# Patient Record
Sex: MALE | Race: WHITE | Employment: OTHER | URBAN - METROPOLITAN AREA
[De-identification: names, ages, dates, MRNs, and addresses within clinical notes are randomized per-mention and may not be internally consistent; named-entity substitution may affect disease eponyms.]

---

## 2017-01-26 ENCOUNTER — DOCUMENTATION ONLY (OUTPATIENT)
Dept: CARDIOLOGY CLINIC | Age: 67
End: 2017-01-26

## 2017-01-26 ENCOUNTER — OFFICE VISIT (OUTPATIENT)
Dept: CARDIOLOGY CLINIC | Age: 67
End: 2017-01-26

## 2017-01-26 VITALS
WEIGHT: 255.2 LBS | HEART RATE: 76 BPM | DIASTOLIC BLOOD PRESSURE: 62 MMHG | BODY MASS INDEX: 33.82 KG/M2 | SYSTOLIC BLOOD PRESSURE: 130 MMHG | HEIGHT: 73 IN | RESPIRATION RATE: 16 BRPM

## 2017-01-26 DIAGNOSIS — I25.83 CORONARY ARTERY DISEASE DUE TO LIPID RICH PLAQUE: Primary | ICD-10-CM

## 2017-01-26 DIAGNOSIS — I25.10 CORONARY ARTERY DISEASE DUE TO LIPID RICH PLAQUE: Primary | ICD-10-CM

## 2017-01-26 DIAGNOSIS — E78.5 DYSLIPIDEMIA: ICD-10-CM

## 2017-01-26 DIAGNOSIS — I10 HTN (HYPERTENSION), MALIGNANT: ICD-10-CM

## 2017-01-26 DIAGNOSIS — I11.9 HYPERTENSIVE HEART DISEASE WITHOUT HEART FAILURE: ICD-10-CM

## 2017-01-26 RX ORDER — METFORMIN HYDROCHLORIDE 500 MG/1
1 TABLET ORAL 2 TIMES DAILY
COMMUNITY
Start: 2017-01-25

## 2017-01-26 NOTE — PROGRESS NOTES
GI appointment has been made for patient to see Dr. Isha Asher 1/31/17 @ 12:30- Date/time/location provided to patient. Dr. Ce Jordan appointment made for left knee evaluation scheduled for today 1/26/17 @ 2:45. Date/time/location provided to patient.   2 pt identifiers used

## 2017-01-26 NOTE — MR AVS SNAPSHOT
Visit Information Date & Time Provider Department Dept. Phone Encounter #  
 1/26/2017 11:20 AM Boris Ochoa MD [facility] Kevin Jerez [phone] [id number] Your Appointments 7/28/2017 11:20 AM  
ESTABLISHED PATIENT with Boris Ochoa MD  
[facility] ([facility]) Appt Note: 6 month follow up  
 [address]  
[address] [facility] [address] Upcoming Health Maintenance Date Due Hepatitis C Screening 1950 DTaP/Tdap/Td series (1 - Tdap) 7/4/1972 FOBT Q 1 YEAR AGE 50-75 7/4/2001 ZOSTER VACCINE AGE 60> 7/4/2011 GLAUCOMA SCREENING Q2Y 7/4/2016 Pneumococcal 65+ Low/Medium Risk (1 of 2 - PCV13) 7/4/2016 MEDICARE YEARLY EXAM 7/4/2016 INFLUENZA AGE 9 TO ADULT 8/1/2016 Allergies as of 1/26/2017  Review Complete On: 1/26/2017 By: Raoul Simmonds Severity Noted Reaction Type Reactions Mavik [Trandolapril]  06/26/2012   Side Effect Nausea and Vomiting  
 Sulfa Dyne  06/26/2012   Side Effect Hives Current Immunizations  Never Reviewed No immunizations on file. Not reviewed this visit You Were Diagnosed With   
  
 Codes Comments Coronary artery disease due to lipid rich plaque    -  Primary ICD-10-CM: I25.10, I25.83 ICD-9-CM: 414.00, 414.3 Hypertensive heart disease without heart failure     ICD-10-CM: I11.9 ICD-9-CM: 402.90 Dyslipidemia     ICD-10-CM: E78.5 ICD-9-CM: 272.4 HTN (hypertension), malignant     ICD-10-CM: I10 
ICD-9-CM: 401.0 Vitals BP Pulse Resp Height(growth percentile) Weight(growth percentile) BMI  
 130/62 (BP 1 Location: Right arm, BP Patient Position: Sitting) 76 16 6' 1\" (1.854 m) 255 lb 3.2 oz (115.8 kg) 33.67 kg/m2 Smoking Status Former Smoker Vitals History BMI and BSA Data Body Mass Index Body Surface Area  
 33.67 kg/m 2 2.44 m 2 Preferred Pharmacy Pharmacy Name Phone [facility] [phone] Your Updated Medication List  
  
   
This list is accurate as of: 1/26/17 11:57 AM.  Always use your most recent med list.  
  
  
  
  
 albuterol 2.5 mg /3 mL (0.083 %) nebulizer solution Commonly known as:  PROVENTIL VENTOLIN  
1.25 mg by Nebulization route every four (4) hours as needed. ALLEGRA 180 mg tablet Generic drug:  fexofenadine Take  by mouth daily. aspirin delayed-release 81 mg tablet Take 81 mg by mouth daily. clopidogrel 75 mg Tab Commonly known as:  PLAVIX TAKE ONE TABLET BY MOUTH DAILY FLEXERIL 10 mg tablet Generic drug:  cyclobenzaprine Take  by mouth three (3) times daily as needed. FLOMAX 0.4 mg capsule Generic drug:  tamsulosin Take 0.4 mg by mouth daily. HYDROcodone-acetaminophen 5-325 mg per tablet Commonly known as:  March Castles Take 1 Tab by mouth daily as needed for Pain. LEVOXYL 150 mcg tablet Generic drug:  levothyroxine Take  by mouth Daily (before breakfast). LIPITOR 20 mg tablet Generic drug:  atorvastatin Take 20 mg by mouth daily. metFORMIN 500 mg tablet Commonly known as:  GLUCOPHAGE Take 1 Tab by mouth two (2) times a day. metoprolol tartrate 25 mg tablet Commonly known as:  LOPRESSOR  
TAKE 1/2 TABLET BY MOUTH TWICE DAILY  
  
 multivitamin tablet Commonly known as:  ONE A DAY Take 1 Tab by mouth daily. nitroglycerin 0.4 mg SL tablet Commonly known as:  NITROSTAT  
1 tablet by SubLINGual route every five (5) minutes as needed for Chest Pain for up to 3 doses. PREVACID 30 mg capsule Generic drug:  lansoprazole Take 30 mg by mouth two (2) times a day. valsartan-hydroCHLOROthiazide 160-12.5 mg per tablet Commonly known as:  DIOVAN-HCT  
TAKE ONE TABLET BY MOUTH DAILY We Performed the Following AMB POC EKG ROUTINE W/ 12 LEADS, INTER & REP [41659 CPT(R)] Introducing hospitals & [facility] SERVICES! Caitlin Velasco introduces [employer] patient portal. Now you can access parts of your medical record, email your doctor's office, and request medication refills online. 1. In your internet browser, go to [email or web address] 2. Click on the First Time User? Click Here link in the Sign In box. You will see the New Member Sign Up page. 3. Enter your [employer] Access Code exactly as it appears below. You will not need to use this code after youve completed the sign-up process. If you do not sign up before the expiration date, you must request a new code. · [employer] Access Code: [email or web address] Expires: 4/26/2017 11:56 AM 
 
4. Enter the last four digits of your Social Security Number (xxxx) and Date of Birth (mm/dd/yyyy) as indicated and click Submit. You will be taken to the next sign-up page. 5. Create a Intigua ID. This will be your Intigua login ID and cannot be changed, so think of one that is secure and easy to remember. 6. Create a [employer] password. You can change your password at any time. 7. Enter your Password Reset Question and Answer. This can be used at a later time if you forget your password. 8. Enter your e-mail address. You will receive e-mail notification when new information is available in [address] 9. Click Sign Up. You can now view and download portions of your medical record. 10. Click the Download Summary menu link to download a portable copy of your medical information. If you have questions, please visit the Frequently Asked Questions section of the [employer] website. Remember, [employer]ua is NOT to be used for urgent needs. For medical emergencies, dial 911. Now available from your iPhone and Android! Please provide this summary of care documentation to your next provider. Your primary care clinician is listed as Miguel Hand III. If you have any questions after today's visit, please call [phone].

## 2017-01-26 NOTE — PROGRESS NOTES
[facility]: Andre Azargriselda  [phone]    HPI: Betty Walter is a 73y.o. year-old male with hx of hypertensive heart disease and left heart squeezing. Last visit having some chest pain, stress testing and cho were ok. He is feeling much better now, has a little chest pain with laying down at night that feels more like indigestion and my be stomach related. Also down about 10 pounds from prior. Has a history of ulcers, but no follow-up recently. Many ulcers. Recommended GI follow-up. Needs ortho evaluation for weak leg and left knee pian and giving way. Has fallen. NO shortness of breath and no dizzy spells or cold sweats like he was having before the last stent. He has cut down on his coffee from a few pots a day to just a couple cups. He is a little worried abou this wife's memory. Assessment/Plan:  1. HTN Heart Disease- at goal today, diovanHCT  2. Hyperlipidemia-continue lipitor 20mg daily  3. CAD- s/p LAD DOMINIC 6/14 to LAD, recent negative stress  -cont aspirin, plavix  4. Obesity- Body mass index is 33.67 kg/(m^2). working on diet, exercise as able down about 10 more pounds, to 255 today  5. CVA- no new symptoms, hx with right sided weakness  6. PE/DVT-resolved, off coumadin, post-op from back surgery  7. History of neck pain, in the spinal cord during back surgery with CSF leak  8. Chest pain- refer back to GI given PUD  9. PUD remote hx last egd ok in 2014  10. DM, just diagnosed, work on diet and weight loss, A1C 8    Pao 11/16 normal, inferior fixed defect  Echo 65% mild AI, trivial TR PAP 20  CT chest 6/16 coronary ca, no PE, thoracic degeneration, normal Ao  Lexiscan nuc 6/15 EF 78% no ischemia  Cath 6/2014 LAD 70% s/p 3.5x15mm Xpedition everolimus stent, Lcx mild irreg, RCA nl  Echo 6/14:  Within normal limit, EF 60%, triv AI   Lexiscan Nuc: EF 74%, inferior perfusion defect  Echo 4/10 EF 60% mod cLVH PAP 40, mild SARAN  Stress test 4/10 no ischemia  Cath 2004 with Dr. Muriel Gardner right leg with angioseal.     Fam hx: early CAD, son at 39   Soc hx: retired, remote tobacco, occ etoh    He  has a past medical history of CAD (coronary artery disease); Essential hypertension; Hyperlipidemia; and Stroke ([facility] [address] Cardiovascular ROS: positive for chest pain or dyspnea on exertion  Respiratory ROS: negative for - cough, hemoptysis or orthopnea  Neurological ROS: negative for - bowel and bladder control changes or dizziness  All other systems negative except as above. PE  Vitals:    01/26/17 1121 01/26/17 1126   BP: 130/62 130/62   Pulse: 76    Resp: 16    Weight: 255 lb 3.2 oz (115.8 kg)    Height: 6' 1\" (1.854 m)     Body mass index is 33.67 kg/(m^2).    General appearance - alert, well appearing, and in no distress  Mental status - affect appropriate to mood  Eyes - sclera anicteric, moist mucous membranes  Neck - supple, no significant adenopathy  Lymphatics - no  lymphadenopathy  Chest - clear to auscultation, no wheezes, rales or rhonchi  Heart - normal rate, regular rhythm, normal S1, S2, no murmurs, rubs, clicks or gallops  Abdomen - soft, nontender, nondistended, no masses or organomegaly  Back exam - full range of motion, no tenderness  Neurological - cranial nerves II through XII grossly intact, no focal deficit  Musculoskeletal - no muscular tenderness noted, normal strength  Extremities - peripheral pulses 1+, 1-2+ pedal edema  Skin - normal coloration  no rashes    12 lead ECG: NSR    Recent Labs:  No results found for: CHOL  No results found for: KRISSY  Lab Results   Component Value Date/Time    BUN 16 06/05/2014 12:33 PM     Lab Results   Component Value Date/Time    Potassium 4.0 06/05/2014 12:33 PM     No results found for: HBA1C, [id number]  No components found for: HGBI,  IHGB,  HGB,  HGBP,  WBHGB  Lab Results   Component Value Date/Time    PLATELET 621 06/44/5987 12:33 PM       Reviewed:  Past Medical History   Diagnosis Date    CAD (coronary artery disease)     Essential hypertension     Hyperlipidemia     Stroke ([facility] [address]      History   Smoking Status    Former Smoker    Packs/day: 1.50    Years: 5.00    Types: Cigarettes   Smokeless Tobacco    Not on file     History   Alcohol Use    0.0 oz/week    0 Standard drinks or equivalent per week     Comment: rare     Allergies   Allergen Reactions    Mavik [Trandolapril] Nausea and Vomiting    Sulfa Dyne Hives       Current Outpatient Prescriptions   Medication Sig    valsartan-hydroCHLOROthiazide (DIOVAN-HCT) 160-12.5 mg per tablet TAKE ONE TABLET BY MOUTH DAILY    metoprolol tartrate (LOPRESSOR) 25 mg tablet TAKE 1/2 TABLET BY MOUTH TWICE DAILY    metFORMIN (GLUCOPHAGE) 500 mg tablet Take 1 Tab by mouth two (2) times a day.  clopidogrel (PLAVIX) 75 mg tablet TAKE ONE TABLET BY MOUTH DAILY    nitroglycerin (NITROSTAT) 0.4 mg SL tablet 1 tablet by SubLINGual route every five (5) minutes as needed for Chest Pain for up to 3 doses.  HYDROcodone-acetaminophen (NORCO) 5-325 mg per tablet Take 1 Tab by mouth daily as needed for Pain.  aspirin delayed-release 81 mg tablet Take 81 mg by mouth daily.  multivitamin (ONE A DAY) tablet Take 1 Tab by mouth daily.  atorvastatin (LIPITOR) 20 mg tablet Take 20 mg by mouth daily.  fexofenadine (ALLEGRA) 180 mg tablet Take  by mouth daily.  levothyroxine (LEVOXYL) 150 mcg tablet Take  by mouth Daily (before breakfast).  lansoprazole (PREVACID) 30 mg capsule Take 30 mg by mouth two (2) times a day.  tamsulosin (FLOMAX) 0.4 mg capsule Take 0.4 mg by mouth daily.  albuterol (PROVENTIL VENTOLIN) 2.5 mg /3 mL (0.083 %) nebulizer solution 1.25 mg by Nebulization route every four (4) hours as needed.  cyclobenzaprine (FLEXERIL) 10 mg tablet Take  by mouth three (3) times daily as needed. No current facility-administered medications for this visit.       [facility] [place]  [address]

## 2017-07-28 ENCOUNTER — OFFICE VISIT (OUTPATIENT)
Dept: CARDIOLOGY CLINIC | Age: 67
End: 2017-07-28

## 2017-07-28 VITALS
HEART RATE: 64 BPM | SYSTOLIC BLOOD PRESSURE: 124 MMHG | WEIGHT: 256.6 LBS | DIASTOLIC BLOOD PRESSURE: 76 MMHG | BODY MASS INDEX: 34.01 KG/M2 | RESPIRATION RATE: 16 BRPM | HEIGHT: 73 IN

## 2017-07-28 DIAGNOSIS — E78.5 DYSLIPIDEMIA: ICD-10-CM

## 2017-07-28 DIAGNOSIS — I25.83 CORONARY ARTERY DISEASE DUE TO LIPID RICH PLAQUE: Primary | ICD-10-CM

## 2017-07-28 DIAGNOSIS — R42 DIZZINESS: Primary | ICD-10-CM

## 2017-07-28 DIAGNOSIS — I10 HTN (HYPERTENSION), MALIGNANT: ICD-10-CM

## 2017-07-28 DIAGNOSIS — I11.9 HYPERTENSIVE HEART DISEASE WITHOUT HEART FAILURE: ICD-10-CM

## 2017-07-28 DIAGNOSIS — I25.10 CORONARY ARTERY DISEASE DUE TO LIPID RICH PLAQUE: Primary | ICD-10-CM

## 2017-07-28 NOTE — PROGRESS NOTES
[facility]: Vernon Garcia  [phone]    HPI: Panfilo Lund is a 78y.o. year-old male with hx of hypertensive heart disease and left heart squeezing. He had bronchitis and had to go to the ER. He got treated with zpack and prednisone. He felt very dizzy the last few days after drinking a probiotic and wonders if it could be related. Discussed stopping it. No more chest pain. Got fluid drawn off and injection for the knee. Nees surgery but cant afford to do it due to the demands of taking care of things for him and his wife. He is down a pound but has been splurging. Has ivania drinking too much snapple green tea. NO shortness of breath and no dizzy spells or cold sweats like he was having before the last stent. He has cut down on his coffee from a few pots a day to just a couple cups. He is a little worried about his wife's memory. He is worried aobut the heart causing dizziness, he has some qrs widening and pr prolongation and viktor, will check holter. Assessment/Plan:  1. HTN Heart Disease- at goal today, diovanHCT  2. Hyperlipidemia-continue lipitor 20mg daily  3. CAD- s/p LAD DOMINIC 6/14 to LAD, recent negative stress  -cont aspirin, plavix  4. Obesity- Body mass index is 33.85 kg/(m^2). working on diet, exercise as able down about 10 more pounds, to 255 today  5. CVA- no new symptoms, hx with right sided weakness  6. PE/DVT-resolved, off coumadin, post-op from back surgery  7. History of neck pain, in the spinal cord during back surgery with CSF leak  8. Chest pain- refer back to GI given PUD  9. PUD remote hx last egd ok in 2014  10. DM, just diagnosed, work on diet and weight loss, A1C 8  11.  EKG Sb with 1st degree av block, dizziness with bradycardia- loop to check on cause    Pao 11/16 normal, inferior fixed defect  Echo 65% mild AI, trivial TR PAP 20  CT chest 6/16 coronary ca, no PE, thoracic degeneration, normal Ao  Lexiscan nuc 6/15 EF 78% no ischemia  Cath 6/2014 LAD 70% s/p 3.5x15mm Xpedition everolimus stent, Lcx mild irreg, RCA nl  Echo 6/14: Within normal limit, EF 60%, triv AI   Lexiscan Nuc: EF 74%, inferior perfusion defect  Echo 4/10 EF 60% mod cLVH PAP 40, mild SARAN  Stress test 4/10 no ischemia  Cath 2004 with Dr. Hao Rocha right leg with angioseal.     Fam hx: early CAD, son at 39   Soc hx: retired, remote tobacco, occ etoh    He  has a past medical history of CAD (coronary artery disease); Essential hypertension; Hyperlipidemia; and Stroke ([facility] [address] Cardiovascular ROS: positive for chest pain or dyspnea on exertion  Respiratory ROS: negative for - cough, hemoptysis or orthopnea  Neurological ROS: negative for - bowel and bladder control changes or dizziness  All other systems negative except as above. PE  Vitals:    07/28/17 1148   BP: 124/76   Pulse: 64   Resp: 16   Weight: 256 lb 9.6 oz (116.4 kg)   Height: 6' 1\" (1.854 m)    Body mass index is 33.85 kg/(m^2).    General appearance - alert, well appearing, and in no distress  Mental status - affect appropriate to mood  Eyes - sclera anicteric, moist mucous membranes  Neck - supple, no significant adenopathy  Lymphatics - no  lymphadenopathy  Chest - clear to auscultation, no wheezes, rales or rhonchi  Heart - normal rate, regular rhythm, normal S1, S2, no murmurs, rubs, clicks or gallops  Abdomen - soft, nontender, nondistended, no masses or organomegaly  Back exam - full range of motion, no tenderness  Neurological - cranial nerves II through XII grossly intact, no focal deficit  Musculoskeletal - no muscular tenderness noted, normal strength  Extremities - peripheral pulses 1+, 1-2+ pedal edema  Skin - normal coloration  no rashes    12 lead ECG: NSR    Recent Labs:  No results found for: CHOL  No results found for: KRISSY  Lab Results   Component Value Date/Time    BUN 16 06/05/2014 12:33 PM     Lab Results   Component Value Date/Time    Potassium 4.0 06/05/2014 12:33 PM     No results found for: HBA1C, SMY2QXAR  No components found for: HGBI,  IHGB,  HGB,  HGBP,  WBHGB  Lab Results   Component Value Date/Time    PLATELET 909 01/63/4705 12:33 PM       Reviewed:  Past Medical History:   Diagnosis Date    CAD (coronary artery disease)     Essential hypertension     Hyperlipidemia     Stroke ([facility] [address]      History   Smoking Status    Former Smoker    Packs/day: 1.50    Years: 5.00    Types: Cigarettes   Smokeless Tobacco    Never Used     History   Alcohol Use    0.0 oz/week    0 Standard drinks or equivalent per week     Comment: rare     Allergies   Allergen Reactions    Mavik [Trandolapril] Nausea and Vomiting    Sulfa Chilo Brandt       Current Outpatient Prescriptions   Medication Sig    valsartan-hydroCHLOROthiazide (DIOVAN-HCT) 160-12.5 mg per tablet TAKE ONE TABLET BY MOUTH DAILY    metoprolol tartrate (LOPRESSOR) 25 mg tablet TAKE 1/2 TABLET BY MOUTH TWICE DAILY    metFORMIN (GLUCOPHAGE) 500 mg tablet Take 1 Tab by mouth two (2) times a day.  clopidogrel (PLAVIX) 75 mg tablet TAKE ONE TABLET BY MOUTH DAILY    nitroglycerin (NITROSTAT) 0.4 mg SL tablet 1 tablet by SubLINGual route every five (5) minutes as needed for Chest Pain for up to 3 doses.  HYDROcodone-acetaminophen (NORCO) 5-325 mg per tablet Take 1 Tab by mouth daily as needed for Pain.  aspirin delayed-release 81 mg tablet Take 81 mg by mouth daily.  multivitamin (ONE A DAY) tablet Take 1 Tab by mouth daily.  atorvastatin (LIPITOR) 20 mg tablet Take 20 mg by mouth daily.  fexofenadine (ALLEGRA) 180 mg tablet Take  by mouth daily.  levothyroxine (LEVOXYL) 150 mcg tablet Take  by mouth Daily (before breakfast).  lansoprazole (PREVACID) 30 mg capsule Take 30 mg by mouth two (2) times a day.  tamsulosin (FLOMAX) 0.4 mg capsule Take 0.4 mg by mouth daily.  albuterol (PROVENTIL VENTOLIN) 2.5 mg /3 mL (0.083 %) nebulizer solution 1.25 mg by Nebulization route every four (4) hours as needed.       cyclobenzaprine (FLEXERIL) 10 mg tablet Take  by mouth three (3) times daily as needed. No current facility-administered medications for this visit.       [facility] [place]  [address], [address] [address] [facility] [address]

## 2017-07-28 NOTE — MR AVS SNAPSHOT
Visit Information Date & Time Provider Department Dept. Phone Encounter #  
 7/28/2017 11:20 AM Mary Ortega MD [facility] [place] [phone] [id number] Upcoming Health Maintenance Date Due Hepatitis C Screening 1950 DTaP/Tdap/Td series (1 - Tdap) 7/4/1972 FOBT Q 1 YEAR AGE 50-75 7/4/2001 ZOSTER VACCINE AGE 60> 5/4/2011 GLAUCOMA SCREENING Q2Y 7/4/2016 Pneumococcal 65+ Low/Medium Risk (1 of 2 - PCV13) 7/4/2016 MEDICARE YEARLY EXAM 7/4/2016 INFLUENZA AGE 9 TO ADULT 8/1/2017 Allergies as of 7/28/2017  Review Complete On: 1/26/2017 By: Linda Brar Severity Noted Reaction Type Reactions Mavik [Trandolapril]  06/26/2012   Side Effect Nausea and Vomiting  
 Sulfa Dyne  06/26/2012   Side Effect Hives Current Immunizations  Never Reviewed No immunizations on file. Not reviewed this visit You Were Diagnosed With   
  
 Codes Comments Coronary artery disease due to lipid rich plaque    -  Primary ICD-10-CM: I25.10, I25.83 ICD-9-CM: 414.00, 414.3 Hypertensive heart disease without heart failure     ICD-10-CM: I11.9 ICD-9-CM: 402.90 Dyslipidemia     ICD-10-CM: E78.5 ICD-9-CM: 272.4 HTN (hypertension), malignant     ICD-10-CM: I10 
ICD-9-CM: 401.0 Vitals BP Pulse Resp Height(growth percentile) Weight(growth percentile) BMI  
 124/76 (BP 1 Location: Left arm, BP Patient Position: Sitting) 64 16 6' 1\" (1.854 m) 256 lb 9.6 oz (116.4 kg) 33.85 kg/m2 Smoking Status Former Smoker BMI and BSA Data Body Mass Index Body Surface Area  
 33.85 kg/m 2 2.45 m 2 [phone] Your Updated Medication List  
  
   
This list is accurate as of: 7/28/17 12:24 PM.  Always use your most recent med list.  
  
  
  
  
 albuterol 2.5 mg /3 mL (0.083 %) nebulizer solution Commonly known as:  PROVENTIL VENTOLIN  
1.25 mg by Nebulization route every four (4) hours as needed. ALLEGRA 180 mg tablet Generic drug:  fexofenadine Take  by mouth daily. aspirin delayed-release 81 mg tablet Take 81 mg by mouth daily. clopidogrel 75 mg Tab Commonly known as:  PLAVIX TAKE ONE TABLET BY MOUTH DAILY FLEXERIL 10 mg tablet Generic drug:  cyclobenzaprine Take  by mouth three (3) times daily as needed. FLOMAX 0.4 mg capsule Generic drug:  tamsulosin Take 0.4 mg by mouth daily. HYDROcodone-acetaminophen 5-325 mg per tablet Commonly known as:  Cesar Johnny Take 1 Tab by mouth daily as needed for Pain. LEVOXYL 150 mcg tablet Generic drug:  levothyroxine Take  by mouth Daily (before breakfast). LIPITOR 20 mg tablet Generic drug:  atorvastatin Take 20 mg by mouth daily. metFORMIN 500 mg tablet Commonly known as:  GLUCOPHAGE Take 1 Tab by mouth two (2) times a day. metoprolol tartrate 25 mg tablet Commonly known as:  LOPRESSOR  
TAKE 1/2 TABLET BY MOUTH TWICE DAILY  
  
 multivitamin tablet Commonly known as:  ONE A DAY Take 1 Tab by mouth daily. nitroglycerin 0.4 mg SL tablet Commonly known as:  NITROSTAT  
1 tablet by SubLINGual route every five (5) minutes as needed for Chest Pain for up to 3 doses. PREVACID 30 mg capsule Generic drug:  lansoprazole Take 30 mg by mouth two (2) times a day. valsartan-hydroCHLOROthiazide 160-12.5 mg per tablet Commonly known as:  DIOVAN-HCT  
TAKE ONE TABLET BY MOUTH DAILY We Performed the Following AMB POC EKG ROUTINE W/ 12 LEADS, INTER & REP [39891 CPT(R)] Introducing [facility] & [facility] SERVICES! Betzaida Jasmine introduces [employer] patient portal. Now you can access parts of your medical record, email your doctor's office, and request medication refills online. 1. In your internet browser, go to [email or web address] 2. Click on the First Time User? Click Here link in the Sign In box. You will see the New Member Sign Up page. 3. Enter your [employer] Access Code exactly as it appears below. You will not need to use this code after youve completed the sign-up process. If you do not sign up before the expiration date, you must request a new code. · [employer] Access Code: [email or web address] Expires: 10/26/2017  8:04 AM 
 
4. Enter the last four digits of your Social Security Number (xxxx) and Date of Birth (mm/dd/yyyy) as indicated and click Submit. You will be taken to the next sign-up page. 5. Create a [employer] ID. This will be your [employer] login ID and cannot be changed, so think of one that is secure and easy to remember. 6. Create a [employer] password. You can change your password at any time. 7. Enter your Password Reset Question and Answer. This can be used at a later time if you forget your password. 8. Enter your e-mail address. You will receive e-mail notification when new information is available in [address] 9. Click Sign Up. You can now view and download portions of your medical record. 10. Click the Download Summary menu link to download a portable copy of your medical information. If you have questions, please visit the Frequently Asked Questions section of the [employer] website. Remember, [employer] is NOT to be used for urgent needs. For medical emergencies, dial 911. Now available from your iPhone and Android! Please provide this summary of care documentation to your next provider. Your primary care clinician is listed as Particia Bolls III. If you have any questions after today's visit, please call [phone].

## 2017-08-18 ENCOUNTER — TELEPHONE (OUTPATIENT)
Dept: CARDIOLOGY CLINIC | Age: 67
End: 2017-08-18

## 2017-08-18 NOTE — TELEPHONE ENCOUNTER
----- Message from Francis Tirado NP sent at 8/18/2017 10:32 AM EDT -----  Holter monitor looks ok, no significant bradycardia or heart block to cause his dizziness. Just a few PACs. Above monitor results given to patient.   2 pt identifiers used

## 2017-08-18 NOTE — PROGRESS NOTES
Holter monitor looks ok, no significant bradycardia or heart block to cause his dizziness. Just a few PACs.

## 2017-12-04 ENCOUNTER — OFFICE VISIT (OUTPATIENT)
Dept: CARDIOLOGY CLINIC | Age: 67
End: 2017-12-04

## 2017-12-04 VITALS
DIASTOLIC BLOOD PRESSURE: 56 MMHG | SYSTOLIC BLOOD PRESSURE: 122 MMHG | HEIGHT: 73 IN | BODY MASS INDEX: 33.61 KG/M2 | WEIGHT: 253.6 LBS | HEART RATE: 70 BPM | RESPIRATION RATE: 16 BRPM

## 2017-12-04 DIAGNOSIS — I10 HTN (HYPERTENSION), MALIGNANT: ICD-10-CM

## 2017-12-04 DIAGNOSIS — I25.10 CORONARY ARTERY DISEASE DUE TO LIPID RICH PLAQUE: Primary | ICD-10-CM

## 2017-12-04 DIAGNOSIS — E78.5 DYSLIPIDEMIA: ICD-10-CM

## 2017-12-04 DIAGNOSIS — I25.83 CORONARY ARTERY DISEASE DUE TO LIPID RICH PLAQUE: Primary | ICD-10-CM

## 2017-12-04 NOTE — MR AVS SNAPSHOT
Visit Information Date & Time Provider Department Dept. Phone Encounter #  
 12/4/2017  4:20 PM aSmuel Carl MD [facility] Ghanshyam Edmond [phone] [id number] Your Appointments 12/4/2017  4:20 PM  
ESTABLISHED PATIENT with Samuel Carl MD  
[facility] ([address]) Appt Note: 4 month follow up; 4 month follow up; .; .  
 [address] [facility] [address]  
[address] [facility] [address] Upcoming Health Maintenance Date Due Hepatitis C Screening 1950 DTaP/Tdap/Td series (1 - Tdap) 7/4/1972 FOBT Q 1 YEAR AGE 50-75 7/4/2001 ZOSTER VACCINE AGE 60> 5/4/2011 GLAUCOMA SCREENING Q2Y 7/4/2016 Pneumococcal 65+ Low/Medium Risk (1 of 2 - PCV13) 7/4/2016 MEDICARE YEARLY EXAM 7/4/2016 Influenza Age 5 to Adult 8/1/2017 Allergies as of 12/4/2017  Review Complete On: 12/4/2017 By: Joyce Carroll Severity Noted Reaction Type Reactions Mavik [Trandolapril]  06/26/2012   Side Effect Nausea and Vomiting  
 Sulfa Dyne  06/26/2012   Side Effect Hives Current Immunizations  Never Reviewed No immunizations on file. Not reviewed this visit You Were Diagnosed With   
  
 Codes Comments Coronary artery disease due to lipid rich plaque    -  Primary ICD-10-CM: I25.10, I25.83 ICD-9-CM: 414.00, 414.3 Dyslipidemia     ICD-10-CM: E78.5 ICD-9-CM: 272.4 HTN (hypertension), malignant     ICD-10-CM: I10 
ICD-9-CM: 401.0 Vitals BP Pulse Resp Height(growth percentile) Weight(growth percentile) BMI  
 122/56 (BP 1 Location: Left arm, BP Patient Position: Sitting) 70 16 6' 1\" (1.854 m) 253 lb 9.6 oz (115 kg) 33.46 kg/m2 Smoking Status Former Smoker Vitals History BMI and BSA Data Body Mass Index Body Surface Area  
 33.46 kg/m 2 2.43 m 2 Preferred Pharmacy Pharmacy Name Phone [facility] [phone] Your Updated Medication List  
  
   
This list is accurate as of: 12/4/17  3:53 PM.  Always use your most recent med list.  
  
  
  
  
 albuterol 2.5 mg /3 mL (0.083 %) nebulizer solution Commonly known as:  PROVENTIL VENTOLIN  
1.25 mg by Nebulization route every four (4) hours as needed. ALLEGRA 180 mg tablet Generic drug:  fexofenadine Take  by mouth daily. aspirin delayed-release 81 mg tablet Take 81 mg by mouth daily. clopidogrel 75 mg Tab Commonly known as:  PLAVIX TAKE ONE TABLET BY MOUTH DAILY FLEXERIL 10 mg tablet Generic drug:  cyclobenzaprine Take  by mouth three (3) times daily as needed. FLOMAX 0.4 mg capsule Generic drug:  tamsulosin Take 0.4 mg by mouth daily. HYDROcodone-acetaminophen 5-325 mg per tablet Commonly known as:  Leticia Herson Take 1 Tab by mouth daily as needed for Pain. LEVOXYL 150 mcg tablet Generic drug:  levothyroxine Take  by mouth Daily (before breakfast). LIPITOR 20 mg tablet Generic drug:  atorvastatin Take 20 mg by mouth daily. metFORMIN 500 mg tablet Commonly known as:  GLUCOPHAGE Take 1 Tab by mouth two (2) times a day. metoprolol tartrate 25 mg tablet Commonly known as:  LOPRESSOR  
TAKE 1/2 TABLET BY MOUTH TWICE DAILY  
  
 multivitamin tablet Commonly known as:  ONE A DAY Take 1 Tab by mouth daily. nitroglycerin 0.4 mg SL tablet Commonly known as:  NITROSTAT  
1 tablet by SubLINGual route every five (5) minutes as needed for Chest Pain for up to 3 doses. PREVACID 30 mg capsule Generic drug:  lansoprazole Take 30 mg by mouth two (2) times a day. valsartan-hydroCHLOROthiazide 160-12.5 mg per tablet Commonly known as:  DIOVAN-HCT  
TAKE ONE TABLET BY MOUTH DAILY We Performed the Following AMB POC EKG ROUTINE W/ 12 LEADS, INTER & REP [66685 CPT(R)] Introducing [facility] & HEALTH SERVICES! [facility] introduces Lela patient portal. Now you can access parts of your medical record, email your doctor's office, and request medication refills online. 1. In your internet browser, go to [email or web address]. [employer] 2. Click on the First Time User? Click Here link in the Sign In box. You will see the New Member Sign Up page. 3. Enter your Lela Access Code exactly as it appears below. You will not need to use this code after youve completed the sign-up process. If you do not sign up before the expiration date, you must request a new code. · Lela Access Code: [email or web address] Expires: 3/4/2018  8:16 AM 
 
4. Enter the last four digits of your Social Security Number (xxxx) and Date of Birth (mm/dd/yyyy) as indicated and click Submit. You will be taken to the next sign-up page. 5. Create a Lela ID. This will be your Lela login ID and cannot be changed, so think of one that is secure and easy to remember. 6. Create a Lela password. You can change your password at any time. 7. Enter your Password Reset Question and Answer. This can be used at a later time if you forget your password. 8. Enter your e-mail address. You will receive e-mail notification when new information is available in [address] 9. Click Sign Up. You can now view and download portions of your medical record. 10. Click the Download Summary menu link to download a portable copy of your medical information. If you have questions, please visit the Frequently Asked Questions section of the Lela website. Remember, Lela is NOT to be used for urgent needs. For medical emergencies, dial 911. Now available from your iPhone and Android! Please provide this summary of care documentation to your next provider. Your primary care clinician is listed as Duane Pott III.  If you have any questions after today's visit, please call [phone].

## 2017-12-04 NOTE — PROGRESS NOTES
[employer]: Philomena Goins  [phone]    HPI: Maria Isabel Thomas is a 78y.o. year-old male with hx of hypertensive heart disease and CAD. He is feeling well, says he gets indigestion every now and then but if he avoids spicy food and carbonated drinks he won't have it  Denies any exertional chest pain  Dyspnea with exertion is stable, has controlled asthma  No PND and sleeps on 2 pillows due to back surgeries in the past   He denies any palpitations, dizziness or syncope   No LE edema  Says Dr. Judie Chin follows his cholesterol and he got a good report on his last set of labs   Still hasn't had knee surgery but it is doing a little better, not walking with a cane anymore  Doesn't want to do surgery unless absolutely necessary due to the demands of taking care of his wife  Wife has early Alzheimer's disease according to PCP   He has cut down on his coffee from a few pots a day to just a couple cups. Assessment/Plan:  1. HTN Heart Disease- at goal on metoprolol and diovanHCT  2. Hyperlipidemia-on atorvastatin 20mg daily, will request recent labs from PCP office    3. CAD- s/p LAD DOMINIC 6/14 to LAD, no ischemia on stress test in 11/16   -on ASA, plavix, metoprolol and statin  -follow up in the office in 4 months   4. Obesity- Body mass index is 33.46 kg/(m^2). working on diet, exercise as able   5. CVA- no new symptoms, hx with right sided weakness, on ASA, plavix and statin   6. PE/DVT-resolved, off coumadin, post-op from back surgery  7. History of neck pain, in the spinal cord during back surgery with CSF leak  8. Chest pain- none currently   9. PUD - remote, last EGD in 2014 ok   10. DM - on metformin, followed by PCP   11.  Bradycardia - holter 8/17 without any significant bradycardia, just had PACs    Holter 8/17 - PACs, no significant bradycardia  Pao 11/16 normal, inferior fixed defect  Echo 65% mild AI, trivial TR PAP 20  CT chest 6/16 coronary ca, no PE, thoracic degeneration, normal Ao  Lexiscan nuc 6/15 EF 78% no ischemia  Cath 6/2014 LAD 70% s/p 3.5x15mm Xpedition everolimus stent, Lcx mild irreg, RCA nl  Echo 6/14: Within normal limit, EF 60%, triv AI   Lexiscan Nuc: EF 74%, inferior perfusion defect  Echo 4/10 EF 60% mod cLVH PAP 40, mild SARAN  Stress test 4/10 no ischemia  Cath 2004 with Dr. Maximo Head right leg with angioseal.     Fam hx: early CAD, son at 39   Soc hx: retired, remote tobacco, occ etoh    He  has a past medical history of CAD (coronary artery disease); Essential hypertension; Hyperlipidemia; and Stroke ([facility] [address] Cardiovascular ROS: no chest pain or palpitations   Respiratory ROS: negative for - cough, hemoptysis or orthopnea  Neurological ROS: negative for dizziness or syncope  All other systems negative except as above. PE  Vitals:    12/04/17 1439   BP: 122/56   Pulse: 70   Resp: 16   Weight: 253 lb 9.6 oz (115 kg)   Height: 6' 1\" (1.854 m)    Body mass index is 33.46 kg/(m^2).    General appearance - alert, well appearing, and in no distress  Mental status - affect appropriate to mood  Eyes - sclera anicteric, moist mucous membranes  Neck - supple  Lymph - not assessed   Chest - clear to auscultation, no wheezes, rales or rhonchi  Heart - normal rate, regular rhythm, normal S1, S2, no murmurs, rubs, clicks or gallops  Abdomen - soft, nontender, nondistended, no masses or organomegaly  Back exam - full range of motion, no tenderness  Neurological - cranial nerves II through XII grossly intact, no focal deficit  Musculoskeletal - no muscular tenderness noted, normal strength  Extremities - peripheral pulses 1+, trace LE edema  Skin - normal coloration  no rashes    12 lead ECG: NSR    Recent Labs:  No results found for: CHOL  No results found for: KRISSY  Lab Results   Component Value Date/Time    BUN 16 06/05/2014 12:33 PM     Lab Results   Component Value Date/Time    Potassium 4.0 06/05/2014 12:33 PM     No results found for: HBA1C, VZN0NBPH  No components found for: HGBI,  IHGB, HGB,  HGBP,  WBHGB  Lab Results   Component Value Date/Time    PLATELET 389 80/09/1663 12:33 PM       Reviewed:  Past Medical History:   Diagnosis Date    CAD (coronary artery disease)     Essential hypertension     Hyperlipidemia     Stroke ([facility] [address]      History   Smoking Status    Former Smoker    Packs/day: 1.50    Years: 5.00    Types: Cigarettes   Smokeless Tobacco    Never Used     History   Alcohol Use    0.0 oz/week    0 Standard drinks or equivalent per week     Comment: rare     Allergies   Allergen Reactions    Mavik [Trandolapril] Nausea and Vomiting    Sulfa Chilo Brandt       Current Outpatient Prescriptions   Medication Sig    valsartan-hydroCHLOROthiazide (DIOVAN-HCT) 160-12.5 mg per tablet TAKE ONE TABLET BY MOUTH DAILY    metoprolol tartrate (LOPRESSOR) 25 mg tablet TAKE 1/2 TABLET BY MOUTH TWICE DAILY    metFORMIN (GLUCOPHAGE) 500 mg tablet Take 1 Tab by mouth two (2) times a day.  clopidogrel (PLAVIX) 75 mg tablet TAKE ONE TABLET BY MOUTH DAILY    nitroglycerin (NITROSTAT) 0.4 mg SL tablet 1 tablet by SubLINGual route every five (5) minutes as needed for Chest Pain for up to 3 doses.  HYDROcodone-acetaminophen (NORCO) 5-325 mg per tablet Take 1 Tab by mouth daily as needed for Pain.  aspirin delayed-release 81 mg tablet Take 81 mg by mouth daily.  multivitamin (ONE A DAY) tablet Take 1 Tab by mouth daily.  atorvastatin (LIPITOR) 20 mg tablet Take 20 mg by mouth daily.  fexofenadine (ALLEGRA) 180 mg tablet Take  by mouth daily.  levothyroxine (LEVOXYL) 150 mcg tablet Take  by mouth Daily (before breakfast).  lansoprazole (PREVACID) 30 mg capsule Take 30 mg by mouth two (2) times a day.  tamsulosin (FLOMAX) 0.4 mg capsule Take 0.4 mg by mouth daily.  albuterol (PROVENTIL VENTOLIN) 2.5 mg /3 mL (0.083 %) nebulizer solution 1.25 mg by Nebulization route every four (4) hours as needed.       cyclobenzaprine (FLEXERIL) 10 mg tablet Take  by mouth three (3) times daily as needed. No current facility-administered medications for this visit.       [facility] [place]  [address], [address] [place] [address]

## 2018-04-05 ENCOUNTER — OFFICE VISIT (OUTPATIENT)
Dept: CARDIOLOGY CLINIC | Age: 68
End: 2018-04-05

## 2018-04-05 VITALS
HEART RATE: 72 BPM | RESPIRATION RATE: 16 BRPM | OXYGEN SATURATION: 98 % | SYSTOLIC BLOOD PRESSURE: 110 MMHG | BODY MASS INDEX: 34.06 KG/M2 | DIASTOLIC BLOOD PRESSURE: 60 MMHG | HEIGHT: 73 IN | WEIGHT: 257 LBS

## 2018-04-05 DIAGNOSIS — E66.9 CLASS 1 OBESITY WITH SERIOUS COMORBIDITY AND BODY MASS INDEX (BMI) OF 33.0 TO 33.9 IN ADULT, UNSPECIFIED OBESITY TYPE: ICD-10-CM

## 2018-04-05 DIAGNOSIS — J01.11 ACUTE RECURRENT FRONTAL SINUSITIS: ICD-10-CM

## 2018-04-05 DIAGNOSIS — I25.10 CORONARY ARTERY DISEASE DUE TO LIPID RICH PLAQUE: Primary | ICD-10-CM

## 2018-04-05 DIAGNOSIS — E88.81 INSULIN RESISTANCE: ICD-10-CM

## 2018-04-05 DIAGNOSIS — I10 HTN (HYPERTENSION), MALIGNANT: ICD-10-CM

## 2018-04-05 DIAGNOSIS — I25.83 CORONARY ARTERY DISEASE DUE TO LIPID RICH PLAQUE: Primary | ICD-10-CM

## 2018-04-05 DIAGNOSIS — E78.5 DYSLIPIDEMIA: ICD-10-CM

## 2018-04-05 DIAGNOSIS — I11.9 HYPERTENSIVE HEART DISEASE WITHOUT HEART FAILURE: ICD-10-CM

## 2018-04-05 RX ORDER — AZITHROMYCIN 250 MG/1
TABLET, FILM COATED ORAL
Qty: 6 TAB | Refills: 3 | Status: SHIPPED | OUTPATIENT
Start: 2018-04-05 | End: 2018-04-10

## 2018-04-05 RX ORDER — PSEUDOEPHEDRINE HCL 30 MG
TABLET ORAL
COMMUNITY
End: 2018-08-01

## 2018-04-05 NOTE — MR AVS SNAPSHOT
[address] [facility] [address] [place] 
[phone] Patient: Lurdes Shah MRN: [id number] SMJ:[id number] Visit Information Date & Time Provider Department Dept. Phone Encounter #  
 4/5/2018  3:00 PM Mao Mueller MD [facility] Waldo Sanchez [phone] [id number] Your Appointments 8/13/2018  3:40 PM  
ESTABLISHED PATIENT with Mao Mueller MD  
[facility] ([address] [facility]) Appt Note: 4 mfu  
 [address] [facility] [address] [facility]  
[address] [facility] 7 [address] Upcoming Health Maintenance Date Due Hepatitis C Screening 1950 DTaP/Tdap/Td series (1 - Tdap) 7/4/1972 FOBT Q 1 YEAR AGE 50-75 7/4/2001 ZOSTER VACCINE AGE 60> 5/4/2011 GLAUCOMA SCREENING Q2Y 7/4/2016 Pneumococcal 65+ Low/Medium Risk (1 of 2 - PCV13) 7/4/2016 MEDICARE YEARLY EXAM 3/14/2018 Allergies as of 4/5/2018  Review Complete On: 4/5/2018 By: Anita Lehman Severity Noted Reaction Type Reactions Mavik [Trandolapril]  06/26/2012   Side Effect Nausea and Vomiting  
 Sulfa Dyne  06/26/2012   Side Effect Hives Current Immunizations  Never Reviewed No immunizations on file. Not reviewed this visit You Were Diagnosed With   
  
 Codes Comments Acute recurrent frontal sinusitis    -  Primary ICD-10-CM: J01.11 
ICD-9-CM: 461.1 Coronary artery disease due to lipid rich plaque     ICD-10-CM: I25.10, I25.83 ICD-9-CM: 414.00, 414.3 Hypertensive heart disease without heart failure     ICD-10-CM: I11.9 ICD-9-CM: 402.90 Dyslipidemia     ICD-10-CM: E78.5 ICD-9-CM: 272.4 HTN (hypertension), malignant     ICD-10-CM: I10 
ICD-9-CM: 401.0 Insulin resistance     ICD-10-CM: W56.55 ICD-9-CM: 277.7  Class 1 obesity with serious comorbidity and body mass index (BMI) of 33.0 to 33.9 in adult, unspecified obesity type     ICD-10-CM: E66.9, Z68.33 
ICD-9-CM: 278.00, V85.33 Vitals BP Pulse Resp Height(growth percentile) Weight(growth percentile) SpO2  
 110/60 (BP 1 Location: Right arm, BP Patient Position: Sitting) 72 16 6' 1\" (1.854 m) 257 lb (116.6 kg) 98% BMI Smoking Status 33.91 kg/m2 Former Smoker Vitals History BMI and BSA Data Body Mass Index Body Surface Area  
 33.91 kg/m 2 2.45 m 2 Preferred Pharmacy Pharmacy Name Phone [facility] [phone] Your Updated Medication List  
  
   
This list is accurate as of 4/5/18  3:47 PM.  Always use your most recent med list.  
  
  
  
  
 albuterol 2.5 mg /3 mL (0.083 %) nebulizer solution Commonly known as:  PROVENTIL VENTOLIN  
1.25 mg by Nebulization route every four (4) hours as needed. ALLEGRA 180 mg tablet Generic drug:  fexofenadine Take  by mouth daily. aspirin delayed-release 81 mg tablet Take 81 mg by mouth daily. azithromycin 250 mg tablet Commonly known as:  Ivania Elmoey Take as directed  
  
 clopidogrel 75 mg Tab Commonly known as:  PLAVIX TAKE ONE TABLET BY MOUTH DAILY FLEXERIL 10 mg tablet Generic drug:  cyclobenzaprine Take  by mouth three (3) times daily as needed. FLOMAX 0.4 mg capsule Generic drug:  tamsulosin Take 0.4 mg by mouth daily. HYDROcodone-acetaminophen 5-325 mg per tablet Commonly known as:  Nicolle Ga Take 1 Tab by mouth daily as needed for Pain. LEVOXYL 150 mcg tablet Generic drug:  levothyroxine Take  by mouth Daily (before breakfast). LIPITOR 20 mg tablet Generic drug:  atorvastatin Take 20 mg by mouth daily. metFORMIN 500 mg tablet Commonly known as:  GLUCOPHAGE Take 1 Tab by mouth two (2) times a day. metoprolol tartrate 25 mg tablet Commonly known as:  LOPRESSOR  
 TAKE 1/2 TABLET BY MOUTH TWICE DAILY  
  
 multivitamin tablet Commonly known as:  ONE A DAY Take 1 Tab by mouth daily. nitroglycerin 0.4 mg SL tablet Commonly known as:  NITROSTAT  
1 tablet by SubLINGual route every five (5) minutes as needed for Chest Pain for up to 3 doses. PREVACID 30 mg capsule Generic drug:  lansoprazole Take 30 mg by mouth two (2) times a day. pseudoephedrine 30 mg tablet Commonly known as:  SUDAFED Take  by mouth every four (4) hours as needed for Congestion. valsartan-hydroCHLOROthiazide 160-12.5 mg per tablet Commonly known as:  DIOVAN-HCT  
TAKE ONE TABLET BY MOUTH DAILY Prescriptions Sent to Pharmacy Refills  
 azithromycin (ZITHROMAX) 250 mg tablet 3 Sig: Take as directed Class: Normal  
 [facility] #: [phone] Introducing \[facility]\"" & HEALTH SERVICES! Shawna Cárdenas introduces [employer] patient portal. Now you can access parts of your medical record, email your doctor's office, and request medication refills online. 1. In your internet browser, go to [email or web address] 2. Click on the First Time User? Click Here link in the Sign In box. You will see the New Member Sign Up page. 3. Enter your [employer] Access Code exactly as it appears below. You will not need to use this code after youve completed the sign-up process. If you do not sign up before the expiration date, you must request a new code. · [employer] Access Code: [email or web address] Expires: 7/4/2018  1:20 PM 
 
4. Enter the last four digits of your Social Security Number (xxxx) and Date of Birth (mm/dd/yyyy) as indicated and click Submit. You will be taken to the next sign-up page. 5. Create a [employer] ID. This will be your [employer] login ID and cannot be changed, so think of one that is secure and easy to remember. 6. Create a [email or web address] password. You can change your password at any time. 7. Enter your Password Reset Question and Answer. This can be used at a later time if you forget your password. 8. Enter your e-mail address. You will receive e-mail notification when new information is available in [address] 9. Click Sign Up. You can now view and download portions of your medical record. 10. Click the Download Summary menu link to download a portable copy of your medical information. If you have questions, please visit the Frequently Asked Questions section of the [email or web address] website. Remember, [email or web address] is NOT to be used for urgent needs. For medical emergencies, dial 911. Now available from your iPhone and Android! Please provide this summary of care documentation to your next provider. Your primary care clinician is listed as Kyara Pulido III. If you have any questions after today's visit, please call [phone].

## 2018-04-05 NOTE — PROGRESS NOTES
[employer]: Desmond Encinas  [phone]    HPI: Brittany Bucio is a 78y.o. year-old male with hx of hypertensive heart disease and CAD. He has been having a lot of sinus congestion, taking sudafed. Is worried about developing strep throat as he often does after sinus infection. Green mucus. No feer or chills, some cough. 4 days in now. Limited by his back pain, feels tired. No chest pan, no TREVIÑO>   Did see ortho and got a knee injection and it feels better. He is feeling well, says he gets indigestion every now and then but if he avoids spicy food and carbonated drinks he won't have it  Denies any exertional chest pain  Dyspnea with exertion is stable, has controlled asthma  No PND and sleeps on 2 pillows due to back surgeries in the past   He denies any palpitations, dizziness or syncope   No LE edema  Says Dr. Beryl Boss follows his cholesterol and he got a good report on his last set of labs   Still hasn't had knee surgery but it is doing a little better, not walking with a cane anymore  Doesn't want to do surgery unless absolutely necessary due to the demands of taking care of his wife  Wife has early Alzheimer's disease according to PCP   He has cut down on his coffee from a few pots a day to just a couple cups. Assessment/Plan:  1. HTN Heart Disease- at goal on metoprolol and diovanHCT  2. Hyperlipidemia-on atorvastatin 20mg daily, at goal  3. CAD- s/p LAD DOMINIC 6/14 to LAD, no ischemia on stress test in 11/16   -on ASA, plavix, metoprolol and statin  -follow up in the office in 4 months   4. Obesity- Body mass index is 33.91 kg/(m^2). working on diet, exercise as able   5. CVA- no new symptoms, hx with right sided weakness, on ASA, plavix and statin   6. PE/DVT-resolved, off coumadin, post-op from back surgery  7. History of neck pain, in the spinal cord during back surgery with CSF leak  8. Chest pain- none currently   9. PUD - remote, last EGD in 2014 ok   10.  DM - on metformin, followed by PCP   11. Bradycardia - holter 8/17 without any significant bradycardia, just had PACs    Holter 8/17 - PACs, no significant bradycardia  Pao 11/16 normal, inferior fixed defect  Echo 65% mild AI, trivial TR PAP 20  CT chest 6/16 coronary ca, no PE, thoracic degeneration, normal Ao  Lexiscan nuc 6/15 EF 78% no ischemia  Cath 6/2014 LAD 70% s/p 3.5x15mm Xpedition everolimus stent, Lcx mild irreg, RCA nl  Echo 6/14: Within normal limit, EF 60%, triv AI   Lexiscan Nuc: EF 74%, inferior perfusion defect  Echo 4/10 EF 60% mod cLVH PAP 40, mild SARAN  Stress test 4/10 no ischemia  Cath 2004 with Dr. Eric Gamez right leg with angioseal.     Fam hx: early CAD, son at 39   Soc hx: retired, remote tobacco, occ etoh    He  has a past medical history of CAD (coronary artery disease); Essential hypertension; Hyperlipidemia; and Stroke ([facility] [address] Cardiovascular ROS: no chest pain or palpitations   Respiratory ROS: negative for - cough, hemoptysis or orthopnea  Neurological ROS: negative for dizziness or syncope  All other systems negative except as above. PE  Vitals:    04/05/18 1502 04/05/18 1511   BP: 100/58 110/60   Pulse: 72    Resp: 16    SpO2: 98%    Weight: 257 lb (116.6 kg)    Height: 6' 1\" (1.854 m)     Body mass index is 33.91 kg/(m^2).    General appearance - alert, well appearing, and in no distress  Mental status - affect appropriate to mood  Eyes - sclera anicteric, moist mucous membranes  Neck - supple  Lymph - not assessed   Chest - clear to auscultation, no wheezes, rales or rhonchi  Heart - normal rate, regular rhythm, normal S1, S2, no murmurs, rubs, clicks or gallops  Abdomen - soft, nontender, nondistended, no masses or organomegaly  Back exam - full range of motion, no tenderness  Neurological - cranial nerves II through XII grossly intact, no focal deficit  Musculoskeletal - no muscular tenderness noted, normal strength  Extremities - peripheral pulses 1+, trace LE edema  Skin - normal coloration  no meredith    12 lead ECG: NSR    Recent Labs:  No results found for: CHOL  No results found for: KRISSY  Lab Results   Component Value Date/Time    BUN 16 06/05/2014 12:33 PM     Lab Results   Component Value Date/Time    Potassium 4.0 06/05/2014 12:33 PM     No results found for: HBA1C, YDW4YRFR  No components found for: HGBI,  IHGB,  HGB,  HGBP,  WBHGB  Lab Results   Component Value Date/Time    PLATELET 363 75/57/3569 12:33 PM       Reviewed:  Past Medical History:   Diagnosis Date    CAD (coronary artery disease)     Essential hypertension     Hyperlipidemia     Stroke ([facility] [address]      History   Smoking Status    Former Smoker    Packs/day: 1.50    Years: 5.00    Types: Cigarettes   Smokeless Tobacco    Never Used     History   Alcohol Use    0.0 oz/week    0 Standard drinks or equivalent per week     Comment: rare     Allergies   Allergen Reactions    Mavik [Trandolapril] Nausea and Vomiting    Sulfa Chilo Brandt       Current Outpatient Prescriptions   Medication Sig    pseudoephedrine (SUDAFED) 30 mg tablet Take  by mouth every four (4) hours as needed for Congestion.  valsartan-hydroCHLOROthiazide (DIOVAN-HCT) 160-12.5 mg per tablet TAKE ONE TABLET BY MOUTH DAILY    metoprolol tartrate (LOPRESSOR) 25 mg tablet TAKE 1/2 TABLET BY MOUTH TWICE DAILY    metFORMIN (GLUCOPHAGE) 500 mg tablet Take 1 Tab by mouth two (2) times a day.  clopidogrel (PLAVIX) 75 mg tablet TAKE ONE TABLET BY MOUTH DAILY    nitroglycerin (NITROSTAT) 0.4 mg SL tablet 1 tablet by SubLINGual route every five (5) minutes as needed for Chest Pain for up to 3 doses.  HYDROcodone-acetaminophen (NORCO) 5-325 mg per tablet Take 1 Tab by mouth daily as needed for Pain.  aspirin delayed-release 81 mg tablet Take 81 mg by mouth daily.  multivitamin (ONE A DAY) tablet Take 1 Tab by mouth daily.  atorvastatin (LIPITOR) 20 mg tablet Take 20 mg by mouth daily.     levothyroxine (LEVOXYL) 150 mcg tablet Take  by mouth Daily (before breakfast).  lansoprazole (PREVACID) 30 mg capsule Take 30 mg by mouth two (2) times a day.  tamsulosin (FLOMAX) 0.4 mg capsule Take 0.4 mg by mouth daily.  albuterol (PROVENTIL VENTOLIN) 2.5 mg /3 mL (0.083 %) nebulizer solution 1.25 mg by Nebulization route every four (4) hours as needed.  cyclobenzaprine (FLEXERIL) 10 mg tablet Take  by mouth three (3) times daily as needed.  fexofenadine (ALLEGRA) 180 mg tablet Take  by mouth daily. No current facility-administered medications for this visit.       [facility] [place]  [address], [address], [address] [place] [address]

## 2018-07-23 ENCOUNTER — TELEPHONE (OUTPATIENT)
Dept: CARDIOLOGY CLINIC | Age: 68
End: 2018-07-23

## 2018-07-23 NOTE — TELEPHONE ENCOUNTER
Mr. Claudeen Hollering calls today and he is concerned about his heart rate. He states his HR is 90s this week, and it is usually 60s-70s. He states he has taken it [place] few times,\" and is getting consistent results. He denies associated symptoms, but states he did change BP medications ACEI on Friday due to nationwide recall. He does not feel that change is related. He was offered to come to clinic for nurse visit and have vitals, EKG and he did schedule appt. The patient verbalized understanding and will call our office with any further questions or concerns.     Future Appointments  Date Time Provider Yariel Mckinley   7/25/2018 11:00 AM HOLTER, [address] [address]   8/1/2018 2:20 PM Ralph Bella  [address]

## 2018-07-25 ENCOUNTER — CLINICAL SUPPORT (OUTPATIENT)
Dept: CARDIOLOGY CLINIC | Age: 68
End: 2018-07-25

## 2018-07-25 DIAGNOSIS — I11.9 HYPERTENSIVE HEART DISEASE WITHOUT HEART FAILURE: Primary | ICD-10-CM

## 2018-07-25 DIAGNOSIS — I25.10 CORONARY ARTERY DISEASE DUE TO LIPID RICH PLAQUE: ICD-10-CM

## 2018-07-25 DIAGNOSIS — I25.83 CORONARY ARTERY DISEASE DUE TO LIPID RICH PLAQUE: ICD-10-CM

## 2018-07-25 DIAGNOSIS — I10 HTN (HYPERTENSION), MALIGNANT: ICD-10-CM

## 2018-08-01 ENCOUNTER — OFFICE VISIT (OUTPATIENT)
Dept: CARDIOLOGY CLINIC | Age: 68
End: 2018-08-01

## 2018-08-01 VITALS
BODY MASS INDEX: 34.19 KG/M2 | DIASTOLIC BLOOD PRESSURE: 56 MMHG | HEART RATE: 69 BPM | WEIGHT: 258 LBS | HEIGHT: 73 IN | SYSTOLIC BLOOD PRESSURE: 112 MMHG

## 2018-08-01 DIAGNOSIS — I25.83 CORONARY ARTERY DISEASE DUE TO LIPID RICH PLAQUE: Primary | ICD-10-CM

## 2018-08-01 DIAGNOSIS — I10 HTN (HYPERTENSION), MALIGNANT: ICD-10-CM

## 2018-08-01 DIAGNOSIS — R00.2 PALPITATIONS: ICD-10-CM

## 2018-08-01 DIAGNOSIS — I25.10 CORONARY ARTERY DISEASE DUE TO LIPID RICH PLAQUE: Primary | ICD-10-CM

## 2018-08-01 DIAGNOSIS — E78.5 DYSLIPIDEMIA: ICD-10-CM

## 2018-08-01 RX ORDER — LOSARTAN POTASSIUM AND HYDROCHLOROTHIAZIDE 25; 100 MG/1; MG/1
1 TABLET ORAL DAILY
COMMUNITY
End: 2020-01-02

## 2018-08-01 NOTE — PATIENT INSTRUCTIONS
If you develop more palpitations, please increase your water intake and decrease your caffeine intake  If palpitations persist please call the office and we will arrange for you to have a loop monitor

## 2018-08-01 NOTE — MR AVS SNAPSHOT
[address] [facility] [address] [facility] 
[phone] Patient: Bailey Huffman MRN: [id number] NIQ:[id number] Visit Information Date & Time Provider Department Dept. Phone Encounter #  
 8/1/2018  2:20 PM [id number] [phone] [id number] Your Appointments 1/11/2019 11:00 AM  
ESTABLISHED PATIENT with Guilherme Vargas MD  
[facility] ([address]) Appt Note: January appt per Gloria Diana w/Dr. Leonid Roger [address] [facility]  
[address] [phone] Upcoming Health Maintenance Date Due Hepatitis C Screening 1950 DTaP/Tdap/Td series (1 - Tdap) 7/4/1972 FOBT Q 1 YEAR AGE 50-75 7/4/2001 ZOSTER VACCINE AGE 60> 5/4/2011 GLAUCOMA SCREENING Q2Y 7/4/2016 Pneumococcal 65+ Low/Medium Risk (1 of 2 - PCV13) 7/4/2016 MEDICARE YEARLY EXAM 3/14/2018 Influenza Age 5 to Adult 8/1/2018 Allergies as of 8/1/2018  Review Complete On: 8/1/2018 By: Gerry Simpson RN Severity Noted Reaction Type Reactions Mavik [Trandolapril]  06/26/2012   Side Effect Nausea and Vomiting  
 Sulfa Dyne  06/26/2012   Side Effect Hives Current Immunizations  Never Reviewed No immunizations on file. Not reviewed this visit You Were Diagnosed With   
  
 Codes Comments Coronary artery disease due to lipid rich plaque    -  Primary ICD-10-CM: I25.10, I25.83 ICD-9-CM: 414.00, 414.3 Dyslipidemia     ICD-10-CM: E78.5 ICD-9-CM: 272.4 HTN (hypertension), malignant     ICD-10-CM: I10 
ICD-9-CM: 401.0 Palpitations     ICD-10-CM: R00.2 ICD-9-CM: 785.1 Vitals BP Pulse Height(growth percentile) Weight(growth percentile) BMI Smoking Status  112/56 (BP 1 Location: Left arm, BP Patient Position: Sitting) 69 6' 1\" (1.854 m) 258 lb (117 kg) 34.04 kg/m2 Former Smoker Vitals History BMI and BSA Data Body Mass Index Body Surface Area 34.04 kg/m 2 2.45 m 2 Preferred Pharmacy Pharmacy Name Phone [facility] [phone] Your Updated Medication List  
  
   
This list is accurate as of 8/1/18  2:44 PM.  Always use your most recent med list.  
  
  
  
  
 albuterol 2.5 mg /3 mL (0.083 %) nebulizer solution Commonly known as:  PROVENTIL VENTOLIN  
1.25 mg by Nebulization route every four (4) hours as needed. aspirin delayed-release 81 mg tablet Take 81 mg by mouth daily. clopidogrel 75 mg Tab Commonly known as:  PLAVIX TAKE ONE TABLET BY MOUTH DAILY FLEXERIL 10 mg tablet Generic drug:  cyclobenzaprine Take  by mouth three (3) times daily as needed. FLOMAX 0.4 mg capsule Generic drug:  tamsulosin Take 0.4 mg by mouth daily. HYDROcodone-acetaminophen 5-325 mg per tablet Commonly known as:  [address] Take 1 Tab by mouth daily as needed for Pain. LEVOXYL 150 mcg tablet Generic drug:  levothyroxine Take 137 mcg by mouth Daily (before breakfast). LIPITOR 20 mg tablet Generic drug:  atorvastatin Take 20 mg by mouth daily. losartan-hydroCHLOROthiazide 100-25 mg per tablet Commonly known as:  HYZAAR Take 1 Tab by mouth daily. metFORMIN 500 mg tablet Commonly known as:  GLUCOPHAGE Take 1 Tab by mouth two (2) times a day. metoprolol tartrate 25 mg tablet Commonly known as:  LOPRESSOR  
TAKE 1/2 TABLET BY MOUTH TWICE DAILY  
  
 multivitamin tablet Commonly known as:  ONE A DAY Take 1 Tab by mouth daily. nitroglycerin 0.4 mg SL tablet Commonly known as:  NITROSTAT  
1 tablet by SubLINGual route every five (5) minutes as needed for Chest Pain for up to 3 doses. PREVACID 30 mg capsule Generic drug:  lansoprazole Take 30 mg by mouth two (2) times a day. ZyrTEC 10 mg Cap Generic drug:  Cetirizine Take  by mouth. Patient Instructions If you develop more palpitations, please increase your water intake and decrease your caffeine intake If palpitations persist please call the office and we will arrange for you to have a loop monitor Introducing [facility] SERVICES! Ponceion Peterson introduces [employer] patient portal. Now you can access parts of your medical record, email your doctor's office, and request medication refills online. 1. In your internet browser, go to [email or web address] 2. Click on the First Time User? Click Here link in the Sign In box. You will see the New Member Sign Up page. 3. Enter your [employer] Access Code exactly as it appears below. You will not need to use this code after youve completed the sign-up process. If you do not sign up before the expiration date, you must request a new code. · Tourj[employer] Access Code: [email or web address] Expires: 10/30/2018  2:33 PM 
 
4. Enter the last four digits of your Social Security Number (xxxx) and Date of Birth (mm/dd/yyyy) as indicated and click Submit. You will be taken to the next sign-up page. 5. Create a Tourjive ID. This will be your Tourjive login ID and cannot be changed, so think of one that is secure and easy to remember. 6. Create a [employer] password. You can change your password at any time. 7. Enter your Password Reset Question and Answer. This can be used at a later time if you forget your password. 8. Enter your e-mail address. You will receive e-mail notification when new information is available in [address] 9. Click Sign Up. You can now view and download portions of your medical record. 10. Click the Download Summary menu link to download a portable copy of your medical information.  
 
If you have questions, please visit the Frequently Asked Questions section of the [facility]. Remember, [employer] is NOT to be used for urgent needs. For medical emergencies, dial 911. Now available from your iPhone and Android! Please provide this summary of care documentation to your next provider. Your primary care clinician is listed as Maikel Mariee III. If you have any questions after today's visit, please call [phone].

## 2018-08-01 NOTE — PROGRESS NOTES
[facility]: Kimberly Miller  [phone]    HPI: Ivis Harrison is a 80y.o. year-old male with hx of hypertensive heart disease and CAD. His heart rate was in the 90s a few weeks ago, he admits to being anxious at the time because had just had cataract surgery, he did not have any other symptoms at that time  He has not had any other episodes since that time  Denies any dizziness or syncope  He has had two cataract surgeries in the last 4 weeks, thinks he may have been dehydrated at the time of his heart racing  He usually drinks three bottles of water daily   BP has been good at home - 115-125/55-60mmhg  No chest pain or dyspnea   No LE edema   He is not exercising due to his left knee pain, back pain after 3 back surgeries  No PND and sleeps on 2 pillows due to back pain  Wife has early Alzheimer's disease according to PCP      **Labs received after his office visit dated 7/20/18  CMP ok - crt 1.2, CBC ok, A1C 6.0%  , , HDL 33, LDL 50  TSH 0.60    Assessment/Plan:  1. HTN Heart Disease- at goal on metoprolol and losartan HCT  2. Hyperlipidemia-on atorvastatin 20mg daily, will get lipids from PCP  3. CAD- s/p LAD DOIMNIC 6/14 to LAD, no ischemia on stress test in 11/16   -on ASA, plavix, metoprolol and statin  -follow up with Dr. Kimberly Miller in January 2019, discussed considering a stress test in 2019 to check on progression of CAD   4. Obesity- Body mass index is 34.04 kg/(m^2). working on diet, exercise as able   5. CVA- no new symptoms, hx with right sided weakness, on ASA, plavix and statin   6. PE/DVT-resolved, off coumadin, post-op from back surgery  7. History of neck pain, in the spinal cord during back surgery with CSF leak  8. Chest pain- none currently   9. PUD - remote, last EGD in 2014 ok   10. DM - on metformin, followed by PCP   11. Bradycardia - holter 8/17 without any significant bradycardia, just had PACs, asymptomatic currently  12.  Palpitations - advised him to avoid caffeine and stay hydrated, advised him that stress/anxiety would increase palpitations    Holter 8/17 - PACs, no significant bradycardia  Pao 11/16 normal, inferior fixed defect  Echo 10/16 - EF 65% mild AI, trivial TR PAP 20  CT chest 6/16 coronary ca, no PE, thoracic degeneration, normal Ao  Lexiscan nuc 6/15 EF 78% no ischemia  Cath 6/2014 LAD 70% s/p 3.5x15mm Xpedition everolimus stent, Lcx mild irreg, RCA nl  Echo 6/14: Within normal limit, EF 60%, triv AI   Lexiscan Nuc: EF 74%, inferior perfusion defect  Echo 4/10 EF 60% mod cLVH PAP 40, mild SARAN  Stress test 4/10 no ischemia  Cath 2004 with Dr. Leonel Brandt right leg with angioseal.     Fam hx: early CAD, son at 39   Soc hx: retired, remote tobacco, occ etoh    He  has a past medical history of CAD (coronary artery disease); Essential hypertension; Hyperlipidemia; and Stroke ([facility] [address] Cardiovascular ROS: no chest pain, positive for palpitations   Respiratory ROS: negative for - cough, hemoptysis or orthopnea  Neurological ROS: negative for dizziness or syncope  All other systems negative except as above. PE  Vitals:    08/01/18 1346   BP: 112/56   Pulse: 69   Weight: 258 lb (117 kg)   Height: 6' 1\" (1.854 m)    Body mass index is 34.04 kg/(m^2).    General appearance - alert, well appearing, and in no distress  Mental status - affect appropriate to mood  Eyes - sclera anicteric, moist mucous membranes  Neck - supple  Lymph - not assessed   Chest - clear to auscultation, no wheezes, rales or rhonchi  Heart - normal rate, regular rhythm, normal S1, S2, no murmurs, rubs, clicks or gallops  Abdomen - soft, nontender, nondistended, no masses or organomegaly  Back exam - full range of motion, no tenderness  Neurological - cranial nerves II through XII grossly intact, no focal deficit  Musculoskeletal - no muscular tenderness noted, normal strength  Extremities - peripheral pulses 1+, trace LE edema  Skin - normal coloration  no rashes    Recent Labs:  No results found for: CHOL  No results found for: KRISSY  Lab Results   Component Value Date/Time    BUN 16 06/05/2014 12:33 PM     Lab Results   Component Value Date/Time    Potassium 4.0 06/05/2014 12:33 PM     No results found for: HBA1C, [id number]  No components found for: HGBI,  IHGB,  HGB,  HGBP,  WBHGB  Lab Results   Component Value Date/Time    PLATELET 812 56/54/7994 12:33 PM       Reviewed:  Past Medical History:   Diagnosis Date    CAD (coronary artery disease)     Essential hypertension     Hyperlipidemia     Stroke ([facility] [address]      History   Smoking Status    Former Smoker    Packs/day: 1.50    Years: 5.00    Types: Cigarettes   Smokeless Tobacco    Never Used     History   Alcohol Use    0.0 oz/week    0 Standard drinks or equivalent per week     Comment: rare     Allergies   Allergen Reactions    Mavik [Trandolapril] Nausea and Vomiting    Sulfa Chilo Brandt       Current Outpatient Prescriptions   Medication Sig    losartan-hydroCHLOROthiazide (HYZAAR) 100-25 mg per tablet Take 1 Tab by mouth daily.  Cetirizine (ZYRTEC) 10 mg cap Take  by mouth.  metoprolol tartrate (LOPRESSOR) 25 mg tablet TAKE 1/2 TABLET BY MOUTH TWICE DAILY    metFORMIN (GLUCOPHAGE) 500 mg tablet Take 1 Tab by mouth two (2) times a day.  clopidogrel (PLAVIX) 75 mg tablet TAKE ONE TABLET BY MOUTH DAILY    nitroglycerin (NITROSTAT) 0.4 mg SL tablet 1 tablet by SubLINGual route every five (5) minutes as needed for Chest Pain for up to 3 doses.  HYDROcodone-acetaminophen (NORCO) 5-325 mg per tablet Take 1 Tab by mouth daily as needed for Pain.  aspirin delayed-release 81 mg tablet Take 81 mg by mouth daily.  multivitamin (ONE A DAY) tablet Take 1 Tab by mouth daily.  atorvastatin (LIPITOR) 20 mg tablet Take 20 mg by mouth daily.  levothyroxine (LEVOXYL) 150 mcg tablet Take 137 mcg by mouth Daily (before breakfast).  lansoprazole (PREVACID) 30 mg capsule Take 30 mg by mouth two (2) times a day.       tamsulosin (FLOMAX) 0.4 mg capsule Take 0.4 mg by mouth daily.  albuterol (PROVENTIL VENTOLIN) 2.5 mg /3 mL (0.083 %) nebulizer solution 1.25 mg by Nebulization route every four (4) hours as needed.  cyclobenzaprine (FLEXERIL) 10 mg tablet Take  by mouth three (3) times daily as needed. No current facility-administered medications for this visit.       [facility] [place]  [address], [address], [address] [place] [address] Avenue

## 2019-01-11 ENCOUNTER — OFFICE VISIT (OUTPATIENT)
Dept: CARDIOLOGY CLINIC | Age: 69
End: 2019-01-11

## 2019-01-11 VITALS
DIASTOLIC BLOOD PRESSURE: 84 MMHG | HEIGHT: 73 IN | HEART RATE: 68 BPM | SYSTOLIC BLOOD PRESSURE: 140 MMHG | WEIGHT: 246.4 LBS | BODY MASS INDEX: 32.66 KG/M2 | RESPIRATION RATE: 16 BRPM

## 2019-01-11 DIAGNOSIS — I25.10 CORONARY ARTERY DISEASE DUE TO LIPID RICH PLAQUE: ICD-10-CM

## 2019-01-11 DIAGNOSIS — I11.9 HYPERTENSIVE HEART DISEASE WITHOUT HEART FAILURE: Primary | ICD-10-CM

## 2019-01-11 DIAGNOSIS — I10 HTN (HYPERTENSION), MALIGNANT: ICD-10-CM

## 2019-01-11 DIAGNOSIS — R00.2 PALPITATIONS: ICD-10-CM

## 2019-01-11 DIAGNOSIS — E66.9 CLASS 1 OBESITY WITH SERIOUS COMORBIDITY AND BODY MASS INDEX (BMI) OF 33.0 TO 33.9 IN ADULT, UNSPECIFIED OBESITY TYPE: ICD-10-CM

## 2019-01-11 DIAGNOSIS — I25.83 CORONARY ARTERY DISEASE DUE TO LIPID RICH PLAQUE: ICD-10-CM

## 2019-01-11 DIAGNOSIS — E78.5 DYSLIPIDEMIA: ICD-10-CM

## 2019-01-11 DIAGNOSIS — E88.81 INSULIN RESISTANCE: ICD-10-CM

## 2019-01-11 RX ORDER — LEVOTHYROXINE SODIUM 137 UG/1
TABLET ORAL
COMMUNITY
End: 2020-01-02

## 2019-01-11 NOTE — PROGRESS NOTES
[facility] [employer]: Mirlande So  [phone]    HPI: Lashaun Neely is a 80y.o. year-old male with hx of hypertensive heart disease and CAD. Had some left chest squeezing pain on and off for 3-4 weeks. He fell about 4 weeks ago and hurt his shoulders- could have been a pulled muscle. Started taking his PPI and it got better. No dyspnea no flutters. Down 12 more pounds from prior visit. Watching his diet and eating carefully. .   EKG today is unchanged. Painis less. Denies any dizziness or syncope  Bp running well, no cp, no new mendez  No LE edema   He is not exercising due to his left knee pain, back pain after 3 back surgeries  No PND and sleeps on 2 pillows due to back pain  Wife has early Alzheimer's disease according to PCP      **Labs received after his office visit dated 7/20/18  CMP ok - crt 1.2, CBC ok, A1C 6.0%  , , HDL 33, LDL 50  TSH 0.60    Assessment/Plan:  1. HTN Heart Disease- at goal on metoprolol and losartan HCT  2. Hyperlipidemia-on atorvastatin 20mg daily  3. CAD- s/p LAD DOMINIC 6/14 to LAD, no ischemia on stress test in 11/16 may repeat this year  -on ASA, plavix, metoprolol and statin  4. Obesity- Body mass index is 32.51 kg/m². working on diet, exercise as able   5. CVA- no new symptoms, hx with right sided weakness, on ASA, plavix and statin   6. PE/DVT-resolved, off coumadin, post-op from back surgery  7. History of neck pain, in the spinal cord during back surgery with CSF leak  8. Chest pain- none currently   9. PUD - remote, last EGD in 2014 ok   10. DM - on metformin, followed by PCP   11. Bradycardia - holter 8/17 without any significant bradycardia, just had PACs, asymptomatic currently  12.  Palpitations - advised him to avoid caffeine and stay hydrated, advised him that stress/anxiety would increase palpitations    Holter 8/17 - PACs, no significant bradycardia  Pao 11/16 normal, inferior fixed defect  Echo 10/16 - EF 65% mild AI, trivial TR PAP 20  CT chest 6/16 coronary ca, no PE, thoracic degeneration, normal Ao  Lexiscan nuc 6/15 EF 78% no ischemia  Cath 6/2014 LAD 70% s/p 3.5x15mm Xpedition everolimus stent, Lcx mild irreg, RCA nl  Echo 6/14: Within normal limit, EF 60%, triv AI   Lexiscan Nuc: EF 74%, inferior perfusion defect  Echo 4/10 EF 60% mod cLVH PAP 40, mild SARAN  Stress test 4/10 no ischemia  Cath 2004 with Dr. Antony Dowling right leg with angioseal.     Fam hx: early CAD, son at 39   Soc hx: retired, remote tobacco, occ etoh    He  has a past medical history of CAD (coronary artery disease), Essential hypertension, Hyperlipidemia, and Stroke ([address] Cardiovascular ROS: no chest pain, positive for palpitations   Respiratory ROS: negative for - cough, hemoptysis or orthopnea  Neurological ROS: negative for dizziness or syncope  All other systems negative except as above. PE  Vitals:    01/11/19 1038   BP: 140/84   Pulse: 80   Resp: 16   Weight: 246 lb 6.4 oz (111.8 kg)   Height: 6' 1\" (1.854 m)    Body mass index is 32.51 kg/m².    General appearance - alert, well appearing, and in no distress  Mental status - affect appropriate to mood  Eyes - sclera anicteric, moist mucous membranes  Neck - supple  Lymph - not assessed   Chest - clear to auscultation, no wheezes, rales or rhonchi  Heart - normal rate, regular rhythm, normal S1, S2, no murmurs, rubs, clicks or gallops  Abdomen - soft, nontender, nondistended, no masses or organomegaly  Back exam - full range of motion, no tenderness  Neurological - cranial nerves II through XII grossly intact, no focal deficit  Musculoskeletal - no muscular tenderness noted, normal strength  Extremities - peripheral pulses 1+, trace LE edema  Skin - normal coloration  no rashes    Recent Labs:  No results found for: CHOL  No results found for: KRISSY  Lab Results   Component Value Date/Time    BUN 16 06/05/2014 12:33 PM     Lab Results   Component Value Date/Time    Potassium 4.0 06/05/2014 12:33 PM     No results found for: HBA1C, [id number]  No components found for: HGBI,  IHGB,  HGB,  HGBP,  WBHGB  Lab Results   Component Value Date/Time    PLATELET 152 01/93/5843 12:33 PM       Reviewed:  Past Medical History:   Diagnosis Date    CAD (coronary artery disease)     Essential hypertension     Hyperlipidemia     Stroke ([address]      Social History     Tobacco Use   Smoking Status Former Smoker    Packs/day: 1.50    Years: 5.00    Pack years: 7.50    Types: Cigarettes   Smokeless Tobacco Never Used     Social History     Substance and Sexual Activity   Alcohol Use Yes    Alcohol/week: 0.0 oz    Frequency: Monthly or less    Drinks per session: 1 or 2    Binge frequency: Never    Comment: rare     Allergies   Allergen Reactions    Mavik [Trandolapril] Nausea and Vomiting    Sulfa Dyne Hives       Current Outpatient Medications   Medication Sig    levothyroxine (SYNTHROID) 137 mcg tablet Take  by mouth Daily (before breakfast).  losartan-hydroCHLOROthiazide (HYZAAR) 100-25 mg per tablet Take 1 Tab by mouth daily.  Cetirizine (ZYRTEC) 10 mg cap Take 1 Cap by mouth daily.  metoprolol tartrate (LOPRESSOR) 25 mg tablet TAKE 1/2 TABLET BY MOUTH TWICE DAILY    metFORMIN (GLUCOPHAGE) 500 mg tablet Take 1 Tab by mouth two (2) times a day.  clopidogrel (PLAVIX) 75 mg tablet TAKE ONE TABLET BY MOUTH DAILY    nitroglycerin (NITROSTAT) 0.4 mg SL tablet 1 tablet by SubLINGual route every five (5) minutes as needed for Chest Pain for up to 3 doses.  HYDROcodone-acetaminophen (NORCO) 5-325 mg per tablet Take 1 Tab by mouth daily as needed for Pain.  aspirin delayed-release 81 mg tablet Take 81 mg by mouth daily.  multivitamin (ONE A DAY) tablet Take 1 Tab by mouth daily.  atorvastatin (LIPITOR) 20 mg tablet Take 20 mg by mouth daily.  lansoprazole (PREVACID) 30 mg capsule Take 30 mg by mouth two (2) times a day.  tamsulosin (FLOMAX) 0.4 mg capsule Take 0.4 mg by mouth daily.       albuterol (PROVENTIL VENTOLIN) 2.5 mg /3 mL (0.083 %) nebulizer solution 1.25 mg by Nebulization route every four (4) hours as needed.  cyclobenzaprine (FLEXERIL) 10 mg tablet Take  by mouth three (3) times daily as needed.  levothyroxine (LEVOXYL) 150 mcg tablet Take 137 mcg by mouth Daily (before breakfast). No current facility-administered medications for this visit.       [facility] [place]  [address], [address], [address] [place] [address] Avenue

## 2020-01-02 ENCOUNTER — OFFICE VISIT (OUTPATIENT)
Dept: CARDIOLOGY CLINIC | Age: 70
End: 2020-01-02

## 2020-01-02 VITALS
SYSTOLIC BLOOD PRESSURE: 120 MMHG | HEART RATE: 64 BPM | HEIGHT: 73 IN | DIASTOLIC BLOOD PRESSURE: 66 MMHG | BODY MASS INDEX: 31.94 KG/M2 | WEIGHT: 241 LBS | OXYGEN SATURATION: 98 % | RESPIRATION RATE: 16 BRPM

## 2020-01-02 DIAGNOSIS — I25.83 CORONARY ARTERY DISEASE DUE TO LIPID RICH PLAQUE: Primary | ICD-10-CM

## 2020-01-02 DIAGNOSIS — E78.5 DYSLIPIDEMIA: ICD-10-CM

## 2020-01-02 DIAGNOSIS — I25.10 CORONARY ARTERY DISEASE DUE TO LIPID RICH PLAQUE: Primary | ICD-10-CM

## 2020-01-02 DIAGNOSIS — I10 HTN (HYPERTENSION), MALIGNANT: ICD-10-CM

## 2020-01-02 RX ORDER — LEVOTHYROXINE SODIUM 125 UG/1
TABLET ORAL
COMMUNITY
End: 2021-08-18

## 2020-01-02 RX ORDER — VALSARTAN AND HYDROCHLOROTHIAZIDE 160; 12.5 MG/1; MG/1
1 TABLET, FILM COATED ORAL DAILY
COMMUNITY
Start: 2019-10-07 | End: 2020-02-13 | Stop reason: ALTCHOICE

## 2020-01-02 NOTE — PROGRESS NOTES
[facility]: Noe Benavides  [phone]    HPI: Shira Martinez is a 77y.o. year-old male with hx of hypertensive heart disease and CAD. He has become concerned about CAD progression and has mild dyspnea will proceed with stress testing to investigate further. Trying to eat healthier, he has lost a few pounds even over the holidays  Had labs with Dr. Roberta Leroy a few months ago - will call to get results  He denies any chest pain or palpitations  TREVIÑO is stable, no PND, sleeps on 2 pillows for back pain chronically   Having some vertigo recently, only when he goes from lying down to sitting up   No falls or syncope   Mild LE edema   He is not exercising due to his left knee pain, back pain after 3 back surgeries  Wife has early Alzheimer's disease according to PCP      **Labs received after patient's last office visit dated 8/30/19  CMP ok, , TG 90, LDL 90, HDL 42      Assessment/Plan:  1. HTN Heart Disease- at goal on metoprolol and valsartan HCT  2. Hyperlipidemia - on atorvastatin 20mg daily, will get lipids from PCP office  3. CAD- s/p LAD DOMINIC 6/14 to LAD, will repeat lexiscan nuclear stress test to assess for ischemia since last ischemic evaluation was in 2016  -continue ASA, plavix, metoprolol and statin  -CBC ok in 7/18  -he will follow up with me again in 6 weeks   4. Obesity- Body mass index is 31.8 kg/m². working on diet, exercise as able   5. CVA- no new symptoms, hx with right sided weakness, on ASA, plavix and statin   6. PE/DVT-resolved, off coumadin, post-op from back surgery  7. History of neck pain, in the spinal cord during back surgery with CSF leak  8. Chest pain- none currently   9. PUD - remote, last EGD in 2014 ok   10. DM - A1C 6% in 7/18, management per PCP   11. Bradycardia - holter 8/17 without any significant bradycardia, just had PACs, asymptomatic currently  12.  Palpitations - none recently, previously advised him to limit caffeine and stay hydrated    Holter 8/17 - PACs, no significant bradycardia  Pao 11/16 normal, inferior fixed defect  Echo 10/16 - EF 65% mild AI, trivial TR PAP 20  CT chest 6/16 coronary ca, no PE, thoracic degeneration, normal Ao  Lexiscan nuc 6/15 EF 78% no ischemia  Cath 6/2014 LAD 70% s/p 3.5x15mm Xpedition everolimus stent, Lcx mild irreg, RCA nl  Echo 6/14: Within normal limit, EF 60%, triv AI   Lexiscan Nuc: EF 74%, inferior perfusion defect  Echo 4/10 EF 60% mod cLVH PAP 40, mild SARAN  Stress test 4/10 no ischemia  Cath 2004 with Dr. Reddy Chiu right leg with angioseal.     Fam hx: early CAD, son at 39   Soc hx: retired, remote tobacco, occ etoh    He  has a past medical history of CAD (coronary artery disease), Essential hypertension, Hyperlipidemia, and Stroke ([facility] [address] Cardiovascular ROS: no chest pain or palpitations   Respiratory ROS: negative for - cough, hemoptysis or orthopnea  Neurological ROS: negative for dizziness or syncope  All other systems negative except as above. PE  Vitals:    01/02/20 1010   BP: 120/66   Pulse: 64   Resp: 16   SpO2: 98%   Weight: 241 lb (109.3 kg)   Height: 6' 1\" (1.854 m)    Body mass index is 31.8 kg/m².    General appearance - alert, well appearing, and in no distress  Mental status - affect appropriate to mood  Eyes - sclera anicteric, moist mucous membranes  Neck - supple  Lymph - not assessed   Chest - clear to auscultation, no wheezes, rales or rhonchi  Heart - normal rate, regular rhythm, normal S1, S2, no murmurs, rubs, clicks or gallops  Abdomen - soft, nontender, nondistended  Back exam - full range of motion, no tenderness  Neurological - cranial nerves II through XII grossly intact, no focal deficit  Musculoskeletal - no muscular tenderness noted, normal strength  Extremities - peripheral pulses 1+, trace LE edema  Skin - normal coloration  no rashes    12 lead ECG: NSR with first degree AV block    Recent Labs:  No results found for: CHOL  No results found for: KRISSY  Lab Results   Component Value Date/Time    BUN 16 06/05/2014 12:33 PM     Lab Results   Component Value Date/Time    Potassium 4.0 06/05/2014 12:33 PM     No results found for: HBA1C, [id number]  No components found for: HGBI,  IHGB,  HGB,  HGBP,  WBHGB  Lab Results   Component Value Date/Time    PLATELET 304 07/68/5929 12:33 PM       Reviewed:  Past Medical History:   Diagnosis Date    CAD (coronary artery disease)     Essential hypertension     Hyperlipidemia     Stroke ([facility] [address]      Social History     Tobacco Use   Smoking Status Former Smoker    Packs/day: 1.50    Years: 5.00    Pack years: 7.50    Types: Cigarettes   Smokeless Tobacco Never Used     Social History     Substance and Sexual Activity   Alcohol Use Yes    Alcohol/week: 0.0 standard drinks    Frequency: Monthly or less    Drinks per session: 1 or 2    Binge frequency: Never    Comment: rare     Allergies   Allergen Reactions    Mavik [Trandolapril] Nausea and Vomiting    Sulfa Chilo Brandt       Current Outpatient Medications   Medication Sig    DICLOFENAC SODIUM PO Take 25 mg by mouth daily.  levothyroxine (SYNTHROID) 125 mcg tablet Take  by mouth Daily (before breakfast).  valsartan-hydroCHLOROthiazide (DIOVAN-HCT) 160-12.5 mg per tablet Take 1 Tab by mouth daily.  Cetirizine (ZYRTEC) 10 mg cap Take 1 Cap by mouth daily.  metoprolol tartrate (LOPRESSOR) 25 mg tablet TAKE 1/2 TABLET BY MOUTH TWICE DAILY    metFORMIN (GLUCOPHAGE) 500 mg tablet Take 1 Tab by mouth two (2) times a day.  clopidogrel (PLAVIX) 75 mg tablet TAKE ONE TABLET BY MOUTH DAILY    nitroglycerin (NITROSTAT) 0.4 mg SL tablet 1 tablet by SubLINGual route every five (5) minutes as needed for Chest Pain for up to 3 doses.  HYDROcodone-acetaminophen (NORCO) 5-325 mg per tablet Take 1 Tab by mouth daily as needed for Pain.  aspirin delayed-release 81 mg tablet Take 81 mg by mouth daily.  multivitamin (ONE A DAY) tablet Take 1 Tab by mouth daily.     atorvastatin (LIPITOR) 20 mg tablet Take 20 mg by mouth daily.  lansoprazole (PREVACID) 30 mg capsule Take 30 mg by mouth two (2) times a day.  tamsulosin (FLOMAX) 0.4 mg capsule Take 0.4 mg by mouth daily.  albuterol (PROVENTIL VENTOLIN) 2.5 mg /3 mL (0.083 %) nebulizer solution 1.25 mg by Nebulization route every four (4) hours as needed.  cyclobenzaprine (FLEXERIL) 10 mg tablet Take  by mouth three (3) times daily as needed. No current facility-administered medications for this visit.       [facility] [place]  [address], [address]  [place], [address] [place] [address]

## 2020-01-07 ENCOUNTER — DOCUMENTATION ONLY (OUTPATIENT)
Dept: CARDIOLOGY CLINIC | Age: 70
End: 2020-01-07

## 2020-01-07 NOTE — PROGRESS NOTES
Please let him know that I got his cholesterol labs from his PCP and his LDL is too high for his hx of CAD. LDL 90 and LDL goal < 70. Please ask him to increase his atorvastatin to 40mg daily and send a new Rx to his pharmacy.

## 2020-01-08 ENCOUNTER — TELEPHONE (OUTPATIENT)
Dept: CARDIOLOGY CLINIC | Age: 70
End: 2020-01-08

## 2020-01-08 RX ORDER — ATORVASTATIN CALCIUM 40 MG/1
40 TABLET, FILM COATED ORAL DAILY
Qty: 90 TAB | Refills: 1 | Status: SHIPPED | OUTPATIENT
Start: 2020-01-08 | End: 2021-08-11

## 2020-01-08 NOTE — TELEPHONE ENCOUNTER
Per NP Minerva:    Please let him know that I got his cholesterol labs from his PCP and his LDL is too high for his hx of CAD. LDL 90 and LDL goal < 70. Please ask him to increase his atorvastatin to 40mg daily and send a new Rx to his pharmacy. M for patient to return call at earliest convenience. Requested Prescriptions     Signed Prescriptions Disp Refills    atorvastatin (LIPITOR) 40 mg tablet 90 Tab 1     Sig: Take 1 Tab by mouth daily. Authorizing Provider: Smita Capone     Ordering User: Christine Paige       Patient returned call,, 2 pt identifiers used  Above message given. He will double up on current script.

## 2020-02-13 ENCOUNTER — OFFICE VISIT (OUTPATIENT)
Dept: CARDIOLOGY CLINIC | Age: 70
End: 2020-02-13

## 2020-02-13 VITALS
WEIGHT: 241 LBS | DIASTOLIC BLOOD PRESSURE: 70 MMHG | RESPIRATION RATE: 16 BRPM | SYSTOLIC BLOOD PRESSURE: 146 MMHG | HEART RATE: 60 BPM | BODY MASS INDEX: 31.94 KG/M2 | HEIGHT: 73 IN

## 2020-02-13 DIAGNOSIS — E66.9 CLASS 1 OBESITY WITH SERIOUS COMORBIDITY AND BODY MASS INDEX (BMI) OF 33.0 TO 33.9 IN ADULT, UNSPECIFIED OBESITY TYPE: ICD-10-CM

## 2020-02-13 DIAGNOSIS — I11.9 HYPERTENSIVE HEART DISEASE WITHOUT HEART FAILURE: ICD-10-CM

## 2020-02-13 DIAGNOSIS — I25.10 CORONARY ARTERY DISEASE DUE TO LIPID RICH PLAQUE: Primary | ICD-10-CM

## 2020-02-13 DIAGNOSIS — R53.83 FATIGUE, UNSPECIFIED TYPE: ICD-10-CM

## 2020-02-13 DIAGNOSIS — E78.5 DYSLIPIDEMIA: ICD-10-CM

## 2020-02-13 DIAGNOSIS — I25.83 CORONARY ARTERY DISEASE DUE TO LIPID RICH PLAQUE: Primary | ICD-10-CM

## 2020-02-13 DIAGNOSIS — R06.09 DOE (DYSPNEA ON EXERTION): ICD-10-CM

## 2020-02-13 RX ORDER — VALSARTAN 80 MG/1
1 TABLET ORAL DAILY
COMMUNITY
Start: 2020-02-04 | End: 2020-02-13

## 2020-02-13 RX ORDER — VALSARTAN 160 MG/1
160 TABLET ORAL DAILY
Qty: 90 TAB | Refills: 3 | Status: SHIPPED | OUTPATIENT
Start: 2020-02-13 | End: 2021-01-20

## 2020-02-13 NOTE — PROGRESS NOTES
[facility]: Pattie Stearns  [phone]    HPI: Raven Ocampo is a 77y.o. year-old male with hx of hypertensive heart disease and CAD. Lexiscan nuc looked fine today. Tolerating his lipitor, at the higher dose. BP was a bit low and his bp was cut down, but now it is running higher. So will go back to 160 of the valsartan without the HCT and se how that goes. He denies any chest pain or palpitations  TREVIÑO is stable, no PND, sleeps on 2 pillows for back pain chronically   Having some vertigo recently, only when he goes from lying down to sitting up   No falls or syncope   Mild LE edema   He is not exercising due to his left knee pain, back pain after 3 back surgeries  Wife has early Alzheimer's disease according to PCP      **Labs  8/30/19  CMP ok, , TG 90, LDL 90, HDL 42      Assessment/Plan:  1. HTN Heart Disease- at goal on metoprolol and valsartan HCT  2. Hyperlipidemia - on atorvastatin 20mg daily, will get lipids from PCP office  3. CAD- s/p LAD DOMINIC 6/14 to LAD   -continue ASA, plavix, metoprolol and statin  -CBC ok in 7/18  4. Obesity- Body mass index is 31.8 kg/m². working on diet, exercise as able   5. CVA- no new symptoms, hx with right sided weakness, on ASA, plavix and statin   6. PE/DVT-resolved, off coumadin, post-op from back surgery  7. History of neck pain, in the spinal cord during back surgery with CSF leak  8. Chest pain- none currently   9. PUD - remote, last EGD in 2014 ok   10. DM - A1C 6% in 7/18, management per PCP   11. Bradycardia - holter 8/17 without any significant bradycardia, just had PACs, asymptomatic currently  12.  Palpitations - none recently, previously advised him to limit caffeine and stay hydrated    Holter 8/17 - PACs, no significant bradycardia  Pao 11/16 normal, inferior fixed defect  Echo 10/16 - EF 65% mild AI, trivial TR PAP 20  CT chest 6/16 coronary ca, no PE, thoracic degeneration, normal Ao  Lexiscan nuc 6/15 EF 78% no ischemia  Cath 6/2014 LAD 70% s/p 3.5x15mm Xpedition everolimus stent, Lcx mild irreg, RCA nl  Echo 6/14: Within normal limit, EF 60%, triv AI   Lexiscan Nuc: EF 74%, inferior perfusion defect  Echo 4/10 EF 60% mod cLVH PAP 40, mild SARAN  Stress test 4/10 no ischemia  Cath 2004 with Dr. Mike Parks right leg with angioseal.     Fam hx: early CAD, son at 39   Soc hx: retired, remote tobacco, occ etoh    He  has a past medical history of CAD (coronary artery disease), Essential hypertension, Hyperlipidemia, and Stroke ([facility] [address] Cardiovascular ROS: no chest pain or palpitations   Respiratory ROS: negative for - cough, hemoptysis or orthopnea  Neurological ROS: negative for dizziness or syncope  All other systems negative except as above. PE  Vitals:    02/13/20 1400   Resp: 16   Weight: 241 lb (109.3 kg)   Height: 6' 1\" (1.854 m)    Body mass index is 31.8 kg/m².    General appearance - alert, well appearing, and in no distress  Mental status - affect appropriate to mood  Eyes - sclera anicteric, moist mucous membranes  Neck - supple  Lymph - not assessed   Chest - clear to auscultation, no wheezes, rales or rhonchi  Heart - normal rate, regular rhythm, normal S1, S2, no murmurs, rubs, clicks or gallops  Abdomen - soft, nontender, nondistended  Back exam - full range of motion, no tenderness  Neurological - cranial nerves II through XII grossly intact, no focal deficit  Musculoskeletal - no muscular tenderness noted, normal strength  Extremities - peripheral pulses 1+, trace LE edema  Skin - normal coloration  no rashes    12 lead ECG: NSR with first degree AV block    Recent Labs:  No results found for: CHOL  No results found for: KRISSY  Lab Results   Component Value Date/Time    BUN 16 06/05/2014 12:33 PM     Lab Results   Component Value Date/Time    Potassium 4.0 06/05/2014 12:33 PM     No results found for: HBA1C, [id number]  No components found for: HGBI,  IHGB,  HGB,  HGBP,  WBHGB  Lab Results   Component Value Date/Time    PLATELET 198 06/05/2014 12:33 PM       Reviewed:  Past Medical History:   Diagnosis Date    CAD (coronary artery disease)     Essential hypertension     Hyperlipidemia     Stroke ([facility] [address]      Social History     Tobacco Use   Smoking Status Former Smoker    Packs/day: 1.50    Years: 5.00    Pack years: 7.50    Types: Cigarettes   Smokeless Tobacco Never Used     Social History     Substance and Sexual Activity   Alcohol Use Yes    Alcohol/week: 0.0 standard drinks    Frequency: Monthly or less    Drinks per session: 1 or 2    Binge frequency: Never    Comment: rare     Allergies   Allergen Reactions    Mavik [Trandolapril] Nausea and Vomiting    Sulfa Chilo Brandt       Current Outpatient Medications   Medication Sig    valsartan (DIOVAN) 80 mg tablet Take 1 Tab by mouth daily.  atorvastatin (LIPITOR) 40 mg tablet Take 1 Tab by mouth daily.  DICLOFENAC SODIUM PO Take 25 mg by mouth daily.  levothyroxine (SYNTHROID) 125 mcg tablet Take  by mouth Daily (before breakfast).  Cetirizine (ZYRTEC) 10 mg cap Take 1 Cap by mouth daily.  metoprolol tartrate (LOPRESSOR) 25 mg tablet TAKE 1/2 TABLET BY MOUTH TWICE DAILY    metFORMIN (GLUCOPHAGE) 500 mg tablet Take 1 Tab by mouth two (2) times a day.  clopidogrel (PLAVIX) 75 mg tablet TAKE ONE TABLET BY MOUTH DAILY    nitroglycerin (NITROSTAT) 0.4 mg SL tablet 1 tablet by SubLINGual route every five (5) minutes as needed for Chest Pain for up to 3 doses.  HYDROcodone-acetaminophen (NORCO) 5-325 mg per tablet Take 1 Tab by mouth daily as needed for Pain.  aspirin delayed-release 81 mg tablet Take 81 mg by mouth daily.  multivitamin (ONE A DAY) tablet Take 1 Tab by mouth daily.  lansoprazole (PREVACID) 30 mg capsule Take 30 mg by mouth two (2) times a day.  tamsulosin (FLOMAX) 0.4 mg capsule Take 0.4 mg by mouth daily.       albuterol (PROVENTIL VENTOLIN) 2.5 mg /3 mL (0.083 %) nebulizer solution 1.25 mg by Nebulization route every four (4) hours as needed.  cyclobenzaprine (FLEXERIL) 10 mg tablet Take  by mouth three (3) times daily as needed.  valsartan-hydroCHLOROthiazide (DIOVAN-HCT) 160-12.5 mg per tablet Take 1 Tab by mouth daily. No current facility-administered medications for this visit.       [facility] [place]  [address], [address], [address] [place] [address]

## 2020-08-17 ENCOUNTER — OFFICE VISIT (OUTPATIENT)
Dept: CARDIOLOGY CLINIC | Age: 70
End: 2020-08-17
Payer: MEDICARE

## 2020-08-17 VITALS
RESPIRATION RATE: 16 BRPM | HEART RATE: 67 BPM | SYSTOLIC BLOOD PRESSURE: 134 MMHG | DIASTOLIC BLOOD PRESSURE: 70 MMHG | OXYGEN SATURATION: 98 % | WEIGHT: 240 LBS | HEIGHT: 73 IN | BODY MASS INDEX: 31.81 KG/M2

## 2020-08-17 DIAGNOSIS — E66.9 CLASS 1 OBESITY WITH SERIOUS COMORBIDITY AND BODY MASS INDEX (BMI) OF 33.0 TO 33.9 IN ADULT, UNSPECIFIED OBESITY TYPE: ICD-10-CM

## 2020-08-17 DIAGNOSIS — I25.10 CORONARY ARTERY DISEASE DUE TO LIPID RICH PLAQUE: Primary | ICD-10-CM

## 2020-08-17 DIAGNOSIS — I10 HTN (HYPERTENSION), MALIGNANT: ICD-10-CM

## 2020-08-17 DIAGNOSIS — I11.9 HYPERTENSIVE HEART DISEASE WITHOUT HEART FAILURE: ICD-10-CM

## 2020-08-17 DIAGNOSIS — R06.09 DOE (DYSPNEA ON EXERTION): ICD-10-CM

## 2020-08-17 DIAGNOSIS — I25.83 CORONARY ARTERY DISEASE DUE TO LIPID RICH PLAQUE: Primary | ICD-10-CM

## 2020-08-17 DIAGNOSIS — E78.5 DYSLIPIDEMIA: ICD-10-CM

## 2020-08-17 PROCEDURE — 99214 OFFICE O/P EST MOD 30 MIN: CPT | Performed by: INTERNAL MEDICINE

## 2020-08-17 PROCEDURE — 3017F COLORECTAL CA SCREEN DOC REV: CPT | Performed by: INTERNAL MEDICINE

## 2020-08-17 PROCEDURE — G8752 SYS BP LESS 140: HCPCS | Performed by: INTERNAL MEDICINE

## 2020-08-17 PROCEDURE — G8432 DEP SCR NOT DOC, RNG: HCPCS | Performed by: INTERNAL MEDICINE

## 2020-08-17 PROCEDURE — G8427 DOCREV CUR MEDS BY ELIG CLIN: HCPCS | Performed by: INTERNAL MEDICINE

## 2020-08-17 PROCEDURE — G8536 NO DOC ELDER MAL SCRN: HCPCS | Performed by: INTERNAL MEDICINE

## 2020-08-17 PROCEDURE — G8417 CALC BMI ABV UP PARAM F/U: HCPCS | Performed by: INTERNAL MEDICINE

## 2020-08-17 PROCEDURE — 1101F PT FALLS ASSESS-DOCD LE1/YR: CPT | Performed by: INTERNAL MEDICINE

## 2020-08-17 PROCEDURE — G8754 DIAS BP LESS 90: HCPCS | Performed by: INTERNAL MEDICINE

## 2020-08-17 RX ORDER — ALBUTEROL SULFATE 90 UG/1
2 AEROSOL, METERED RESPIRATORY (INHALATION)
Qty: 1 INHALER | Refills: 2 | Status: SHIPPED | OUTPATIENT
Start: 2020-08-17 | End: 2021-08-11

## 2020-08-17 NOTE — PROGRESS NOTES
[facility]: Beto Lucio  [phone]    HPI: Miko Fried is a 72y.o. year-old male with hx of hypertensive heart disease and CAD. He recently had labs drawn with PCP Dr. Sandie Dela Cruz - will request results  BP doing better on valsartan alone  No chest pain or palpitations  He has had a few falls due to his left knee giving out  Doesn't want surgery because he has to take care of his wife   Ying Flaherty is stable, no PND, sleeps on 2 pillows for back pain chronically   No syncope  No LE edema   He is not exercising due to his left knee pain, back pain after 3 back surgeries  Wife has Alzheimer's disease       **Labs  8/30/19  CMP ok, , TG 90, LDL 90, HDL 42    Assessment/Plan:  1. HTN Heart Disease - at goal on metoprolol and valsartan   2. Hyperlipidemia - on atorvastatin 20mg daily, will get lipids from PCP office  3. CAD- s/p LAD DOMINIC 6/14 to LAD, no ischemia on stress test in 2/20  -continue ASA, plavix, metoprolol and statin  -he will follow up here again in 6 months  4. Obesity- Body mass index is 31.66 kg/m². working on diet, exercise as able   5. CVA- no new symptoms, hx with right sided weakness, on ASA, plavix and statin   6. PE/DVT-resolved, off coumadin, occurred post-op from back surgery  7. History of neck pain, in the spinal cord during back surgery with CSF leak  8. Chest pain- none currently   9. PUD - remote, last EGD in 2014 ok   10. DM - management per PCP   11. Bradycardia - holter 8/17 without any significant bradycardia, just had PACs, asymptomatic currently  12.  Palpitations - none recently, previously advised him to limit caffeine and stay hydrated, will request recent labs from PCP office     Pao Nuc Stress 2/20 - no ischemia   Holter 8/17 - PACs, no significant bradycardia  Pao 11/16 normal, inferior fixed defect  Echo 10/16 - EF 65% mild AI, trivial TR PAP 20  CT chest 6/16 coronary ca, no PE, thoracic degeneration, normal Ao  Lexiscan nuc 6/15 EF 78% no ischemia  Cath 6/2014 LAD 70% s/p 3.5x15mm Xpedition everolimus stent, Lcx mild irreg, RCA nl  Echo 6/14: Within normal limit, EF 60%, triv AI   Lexiscan Nuc: EF 74%, inferior perfusion defect  Echo 4/10 EF 60% mod cLVH PAP 40, mild SARAN  Stress test 4/10 no ischemia  Cath 2004 with Dr. Andrae Doherty right leg with angioseal.     Fam hx: early CAD, son at 39   Soc hx: retired, remote tobacco, occ etoh    He  has a past medical history of CAD (coronary artery disease), Essential hypertension, Hyperlipidemia, and Stroke ([facility] [address] Cardiovascular ROS: no chest pain or palpitations   Respiratory ROS: negative for - cough, hemoptysis or orthopnea  Neurological ROS: negative for dizziness or syncope  All other systems negative except as above. PE  Vitals:    08/17/20 1343   BP: 134/70   Pulse: 67   Resp: 16   SpO2: 98%   Weight: 240 lb (108.9 kg)   Height: 6' 1\" (1.854 m)    Body mass index is 31.66 kg/m².    General appearance - alert, well appearing, and in no distress  Mental status - affect appropriate to mood  Eyes - sclera anicteric, moist mucous membranes  Neck - supple  Lymph - not assessed   Chest - clear to auscultation, no wheezes, rales or rhonchi  Heart - normal rate, regular rhythm, normal S1, S2, no murmurs, rubs, clicks or gallops  Abdomen - soft, nontender, nondistended  Back exam - full range of motion, no tenderness  Neurological - cranial nerves II through XII grossly intact, no focal deficit  Musculoskeletal - no muscular tenderness noted, normal strength  Extremities - peripheral pulses 1+, trace LE edema  Skin - normal coloration  no rashes    Recent Labs:  No results found for: CHOL  No results found for: KRISSY  Lab Results   Component Value Date/Time    BUN 16 06/05/2014 12:33 PM     Lab Results   Component Value Date/Time    Potassium 4.0 06/05/2014 12:33 PM     No results found for: HBA1C, [id number]  No components found for: HGBI,  IHGB,  HGB,  HGBP,  WBHGB  Lab Results   Component Value Date/Time    PLATELET 164 16/14/3546 12:33 PM       Reviewed:  Past Medical History:   Diagnosis Date    CAD (coronary artery disease)     Essential hypertension     Hyperlipidemia     Stroke ([facility] [address]      Social History     Tobacco Use   Smoking Status Former Smoker    Packs/day: 1.50    Years: 5.00    Pack years: 7.50    Types: Cigarettes   Smokeless Tobacco Never Used     Social History     Substance and Sexual Activity   Alcohol Use Not Currently    Alcohol/week: 0.0 standard drinks    Comment: rare     Allergies   Allergen Reactions    Mavik [Trandolapril] Nausea and Vomiting    Sulfa Chilo Brandt       Current Outpatient Medications   Medication Sig    albuterol (PROVENTIL HFA, VENTOLIN HFA, PROAIR HFA) 90 mcg/actuation inhaler Take 2 Puffs by inhalation every six (6) hours as needed for Wheezing or Shortness of Breath.  valsartan (DIOVAN) 160 mg tablet Take 1 Tab by mouth daily.  atorvastatin (LIPITOR) 40 mg tablet Take 1 Tab by mouth daily.  DICLOFENAC SODIUM PO Take 25 mg by mouth daily.  levothyroxine (SYNTHROID) 125 mcg tablet Take  by mouth Daily (before breakfast).  Cetirizine (ZYRTEC) 10 mg cap Take 1 Cap by mouth daily.  metoprolol tartrate (LOPRESSOR) 25 mg tablet TAKE 1/2 TABLET BY MOUTH TWICE DAILY    metFORMIN (GLUCOPHAGE) 500 mg tablet Take 1 Tab by mouth two (2) times a day.  clopidogrel (PLAVIX) 75 mg tablet TAKE ONE TABLET BY MOUTH DAILY    nitroglycerin (NITROSTAT) 0.4 mg SL tablet 1 tablet by SubLINGual route every five (5) minutes as needed for Chest Pain for up to 3 doses.  HYDROcodone-acetaminophen (NORCO) 5-325 mg per tablet Take 1 Tab by mouth daily as needed for Pain.  aspirin delayed-release 81 mg tablet Take 81 mg by mouth daily.  multivitamin (ONE A DAY) tablet Take 1 Tab by mouth daily.  lansoprazole (PREVACID) 30 mg capsule Take 30 mg by mouth two (2) times a day.  tamsulosin (FLOMAX) 0.4 mg capsule Take 0.4 mg by mouth daily.       albuterol (PROVENTIL VENTOLIN) 2.5 mg /3 mL (0.083 %) nebulizer solution 1.25 mg by Nebulization route every four (4) hours as needed.  cyclobenzaprine (FLEXERIL) 10 mg tablet Take  by mouth three (3) times daily as needed. No current facility-administered medications for this visit.       [facility] heart and [facility] [place]  [address]  [place], [address] [place] [address]

## 2021-03-12 ENCOUNTER — TELEPHONE (OUTPATIENT)
Dept: CARDIOLOGY CLINIC | Age: 71
End: 2021-03-12

## 2021-03-12 NOTE — TELEPHONE ENCOUNTER
Received a clearance request from from [facility] Dept of Neurosurgery.     C3-5 decompression surgery with Dr. Hugo Orozco on 4/15/21    Clearance letter faxed per Dr. Yolette Goldstein to [facility] Fax # [phone]

## 2021-03-12 NOTE — LETTER
3/12/2021 3:33 PM 
 
Mr. Leticia Wolf [address] Dear Dr. Roxann Pritchard Fax # [phone] Mr Sina Abebe is currently under the care of [facility]. He is stable to proceed, his CV disease is stable. Last stent 2014--LAD, last EF 65%, Nuclear stress test 2/20--normal limit, EF 66%. No further cardiac evaluation is indicated at this time. Please do not hesitate to contact me with questions. Sincerely, Thom Pollock MD

## 2021-03-24 ENCOUNTER — OFFICE VISIT (OUTPATIENT)
Dept: CARDIOLOGY CLINIC | Age: 71
End: 2021-03-24
Payer: MEDICARE

## 2021-03-24 VITALS
OXYGEN SATURATION: 96 % | SYSTOLIC BLOOD PRESSURE: 142 MMHG | BODY MASS INDEX: 33.13 KG/M2 | RESPIRATION RATE: 16 BRPM | DIASTOLIC BLOOD PRESSURE: 72 MMHG | HEIGHT: 73 IN | HEART RATE: 86 BPM | WEIGHT: 250 LBS

## 2021-03-24 DIAGNOSIS — I11.9 HYPERTENSIVE HEART DISEASE WITHOUT HEART FAILURE: ICD-10-CM

## 2021-03-24 DIAGNOSIS — Z01.818 PRE-OP EXAM: Primary | ICD-10-CM

## 2021-03-24 DIAGNOSIS — I25.83 CORONARY ARTERY DISEASE DUE TO LIPID RICH PLAQUE: ICD-10-CM

## 2021-03-24 DIAGNOSIS — E78.5 DYSLIPIDEMIA: ICD-10-CM

## 2021-03-24 DIAGNOSIS — I25.10 CORONARY ARTERY DISEASE DUE TO LIPID RICH PLAQUE: ICD-10-CM

## 2021-03-24 PROCEDURE — G8417 CALC BMI ABV UP PARAM F/U: HCPCS | Performed by: NURSE PRACTITIONER

## 2021-03-24 PROCEDURE — G8536 NO DOC ELDER MAL SCRN: HCPCS | Performed by: NURSE PRACTITIONER

## 2021-03-24 PROCEDURE — G8754 DIAS BP LESS 90: HCPCS | Performed by: NURSE PRACTITIONER

## 2021-03-24 PROCEDURE — 1101F PT FALLS ASSESS-DOCD LE1/YR: CPT | Performed by: NURSE PRACTITIONER

## 2021-03-24 PROCEDURE — G8432 DEP SCR NOT DOC, RNG: HCPCS | Performed by: NURSE PRACTITIONER

## 2021-03-24 PROCEDURE — G8753 SYS BP > OR = 140: HCPCS | Performed by: NURSE PRACTITIONER

## 2021-03-24 PROCEDURE — 93005 ELECTROCARDIOGRAM TRACING: CPT | Performed by: NURSE PRACTITIONER

## 2021-03-24 PROCEDURE — 99214 OFFICE O/P EST MOD 30 MIN: CPT | Performed by: NURSE PRACTITIONER

## 2021-03-24 PROCEDURE — G0463 HOSPITAL OUTPT CLINIC VISIT: HCPCS | Performed by: NURSE PRACTITIONER

## 2021-03-24 PROCEDURE — 93010 ELECTROCARDIOGRAM REPORT: CPT | Performed by: NURSE PRACTITIONER

## 2021-03-24 PROCEDURE — G8427 DOCREV CUR MEDS BY ELIG CLIN: HCPCS | Performed by: NURSE PRACTITIONER

## 2021-03-24 PROCEDURE — 3017F COLORECTAL CA SCREEN DOC REV: CPT | Performed by: NURSE PRACTITIONER

## 2021-03-24 RX ORDER — UREA 10 %
100 LOTION (ML) TOPICAL DAILY
COMMUNITY

## 2021-03-24 NOTE — PROGRESS NOTES
[facility] [place]: Tyler Jeffries  [phone]    HPI: Leticia Wolf is a 72y.o. year-old male with hx of hypertensive heart disease and CAD. Having back surgery with Dr. Brian Rosario on 4/15/21  BP up today due to traffic, driving in the rain   Having back pain, using 2 canes for balance, has been having falls  No chest pain or palpitations  Dyspnea with exertion is stable, no PND, sleeps on 2 pillows for back pain chronically   Daughter will come take care of wife while he has surgery (has Alzheimer's)  No syncope  Only has mild LE edema    Assessment/Plan:  1. HTN Heart Disease - at goal on metoprolol and valsartan   2. Hyperlipidemia - on atorvastatin 20mg daily, LDL 60 in 8/20   3. CAD- s/p LAD DOMINIC 6/14 to LAD, no ischemia on stress test in 2/20  -continue ASA, plavix, metoprolol and statin  -advised him to hold plavix x 5 days pre-op but to continue ASA  -he will follow up here again with Dr. Tyler Jeffries in May   4. Obesity- Body mass index is 32.98 kg/m². 5. CVA- no new symptoms, hx with right sided weakness, on ASA, plavix and statin   -advised him to hold plavix x 5 days pre-op but to continue ASA  6. PE/DVT-resolved, off coumadin, occurred post-op from back surgery  7. History of neck pain, in the spinal cord during back surgery with CSF leak  8. Chest pain- none currently   9. PUD - remote, last EGD in 2014 ok   10. DM - A1c 5.9% in 8/20, management per PCP   11. Bradycardia - holter 8/17 without any significant bradycardia, just had PACs, asymptomatic currently  12.  Palpitations - none recently, previously advised him to limit caffeine and stay hydrated, labs ok in 8/20      Pao Nuc Stress 2/20 - no ischemia   Holter 8/17 - PACs, no significant bradycardia  Pao 11/16 normal, inferior fixed defect  Echo 10/16 - EF 65% mild AI, trivial TR PAP 20  CT chest 6/16 coronary ca, no PE, thoracic degeneration, normal Ao  Lexiscan nuc 6/15 EF 78% no ischemia  Cath 6/2014 LAD 70% s/p 3.5x15mm Xpedition everolimus stent, Lcx mild irreg, RCA nl  Echo 6/14: Within normal limit, EF 60%, triv AI   Lexiscan Nuc: EF 74%, inferior perfusion defect  Echo 4/10 EF 60% mod cLVH PAP 40, mild SARAN  Stress test 4/10 no ischemia  Cath 2004 with Dr. Antoni Padilla right leg with angioseal.     Fam hx: early CAD, son at 39   Soc hx: retired, remote tobacco, occ etoh    He  has a past medical history of CAD (coronary artery disease), Essential hypertension, Hyperlipidemia, and Stroke ([facility] [address] Cardiovascular ROS: no chest pain or palpitations   Respiratory ROS: negative for - cough, hemoptysis or orthopnea  Neurological ROS: negative for dizziness or syncope  All other systems negative except as above. PE  Vitals:    03/24/21 1257   BP: (!) 142/72   Pulse: 86   Resp: 16   SpO2: 96%   Weight: 250 lb (113.4 kg)   Height: 6' 1\" (1.854 m)    Body mass index is 32.98 kg/m².    General appearance - alert, well appearing, and in no distress  Mental status - affect appropriate to mood  Eyes - sclera anicteric, moist mucous membranes  Neck - supple  Lymph - not assessed   Chest - clear to auscultation, no wheezes, rales or rhonchi  Heart - normal rate, regular rhythm, normal S1, S2, no murmurs, rubs, clicks or gallops  Abdomen - soft, nontender, nondistended  Back exam - full range of motion, no tenderness  Neurological - cranial nerves II through XII grossly intact, no focal deficit  Musculoskeletal - no muscular tenderness noted, normal strength  Extremities - peripheral pulses 1+, trace LE edema  Skin - normal coloration  no rashes    12 lead ECG: NSR with first degree AV block     Recent Labs:  No results found for: CHOL  No results found for: KRISSY  Lab Results   Component Value Date/Time    BUN 16 06/05/2014 12:33 PM     Lab Results   Component Value Date/Time    Potassium 4.0 06/05/2014 12:33 PM     No results found for: HBA1C, [id number]  No components found for: HGBI,  IHGB,  HGB,  HGBP,  WBHGB  Lab Results   Component Value Date/Time PLATELET 920 52/97/9696 12:33 PM       Reviewed:  Past Medical History:   Diagnosis Date    CAD (coronary artery disease)     Essential hypertension     Hyperlipidemia     Stroke ([facility] [address]      Social History     Tobacco Use   Smoking Status Former Smoker    Packs/day: 1.50    Years: 5.00    Pack years: 7.50    Types: Cigarettes   Smokeless Tobacco Never Used     Social History     Substance and Sexual Activity   Alcohol Use Not Currently    Alcohol/week: 0.0 standard drinks    Comment: rare     Allergies   Allergen Reactions    Mavik [Trandolapril] Nausea and Vomiting    Sulfa Chilo Brandt       Current Outpatient Medications   Medication Sig    cyanocobalamin (Vitamin B-12) 100 mcg tablet Take 100 mcg by mouth daily.  valsartan (DIOVAN) 160 mg tablet Take 1 Tab by mouth daily.  albuterol (PROVENTIL HFA, VENTOLIN HFA, PROAIR HFA) 90 mcg/actuation inhaler Take 2 Puffs by inhalation every six (6) hours as needed for Wheezing or Shortness of Breath.  atorvastatin (LIPITOR) 40 mg tablet Take 1 Tab by mouth daily.  DICLOFENAC SODIUM PO Take 25 mg by mouth daily.  levothyroxine (SYNTHROID) 125 mcg tablet Take  by mouth Daily (before breakfast).  Cetirizine (ZYRTEC) 10 mg cap Take 1 Cap by mouth daily.  metoprolol tartrate (LOPRESSOR) 25 mg tablet TAKE 1/2 TABLET BY MOUTH TWICE DAILY    metFORMIN (GLUCOPHAGE) 500 mg tablet Take 1 Tab by mouth two (2) times a day.  clopidogrel (PLAVIX) 75 mg tablet TAKE ONE TABLET BY MOUTH DAILY    nitroglycerin (NITROSTAT) 0.4 mg SL tablet 1 tablet by SubLINGual route every five (5) minutes as needed for Chest Pain for up to 3 doses.  HYDROcodone-acetaminophen (NORCO) 5-325 mg per tablet Take 1 Tab by mouth daily as needed for Pain.  aspirin delayed-release 81 mg tablet Take 81 mg by mouth daily.  multivitamin (ONE A DAY) tablet Take 1 Tab by mouth daily.  lansoprazole (PREVACID) 30 mg capsule Take 30 mg by mouth two (2) times a day.       tamsulosin (FLOMAX) 0.4 mg capsule Take 0.4 mg by mouth daily.  albuterol (PROVENTIL VENTOLIN) 2.5 mg /3 mL (0.083 %) nebulizer solution 1.25 mg by Nebulization route every four (4) hours as needed.  cyclobenzaprine (FLEXERIL) 10 mg tablet Take  by mouth three (3) times daily as needed. No current facility-administered medications for this visit.       [employer] heart and [facility] [place]  [address], [address] [facility], [address] [place] [address]

## 2021-03-24 NOTE — PATIENT INSTRUCTIONS
You may hold your plavix/clopidogrel x 5 days prior to surgery but we would like you to continue your aspirin throughout your surgery if possible

## 2021-03-24 NOTE — LETTER
3/24/2021 12:58 PM 
 
Patient:  Lawyer Garibay YOB: 1950 Date of Visit: 3/24/2021 Dear Dr. Pema Clarke: 
 
I had the pleasure of seeing Mr. Ritu Cha for pre-operative clearance today in the office. He is stable from a cardiovascular standpoint and is ok to proceed with surgery. He is intermediate risk for cardiovascular complications during a non-cardiac surgery. I advised him to hold his plavix x 5 days prior to surgery but he should continue his aspirin if possible. If you have questions, please call our office at [phone].  
 
Sincerely, 
 
 
Joss Saldivar NP

## 2021-06-04 ENCOUNTER — TELEPHONE (OUTPATIENT)
Dept: CARDIOLOGY CLINIC | Age: 71
End: 2021-06-04

## 2021-06-04 NOTE — TELEPHONE ENCOUNTER
Per Dr. Kerry Schaffer:    Looks like last stent in 2014, so aspirin and plavix can be held. Please send [employer] office note. thx     Spoke to Dr. Stephanie Faye. Copy of office note faxed to [phone]. Confirmation received.

## 2021-06-04 NOTE — TELEPHONE ENCOUNTER
Received call from Dr. Maryjane Rubinstein at [facility]. Patient being admitted. Has bilateral thigh hematoma. Wants to know if she can hold his plavix and aspirin. To consult with Dr. Gilles Flores.    Have direct phone number for Dr. Maryjane Rubinstein.

## 2021-08-11 ENCOUNTER — APPOINTMENT (OUTPATIENT)
Dept: GENERAL RADIOLOGY | Age: 71
DRG: 064 | End: 2021-08-11
Attending: EMERGENCY MEDICINE
Payer: MEDICARE

## 2021-08-11 ENCOUNTER — HOSPITAL ENCOUNTER (INPATIENT)
Age: 71
LOS: 7 days | Discharge: NURSING, REHAB OR LONG-TERM CARE | DRG: 064 | End: 2021-08-18
Attending: EMERGENCY MEDICINE | Admitting: INTERNAL MEDICINE
Payer: MEDICARE

## 2021-08-11 ENCOUNTER — APPOINTMENT (OUTPATIENT)
Dept: CT IMAGING | Age: 71
DRG: 064 | End: 2021-08-11
Attending: NURSE PRACTITIONER
Payer: MEDICARE

## 2021-08-11 DIAGNOSIS — R41.82 ALTERED MENTAL STATUS, UNSPECIFIED ALTERED MENTAL STATUS TYPE: ICD-10-CM

## 2021-08-11 DIAGNOSIS — N39.0 URINARY TRACT INFECTION WITHOUT HEMATURIA, SITE UNSPECIFIED: ICD-10-CM

## 2021-08-11 DIAGNOSIS — R44.3 HALLUCINATIONS: ICD-10-CM

## 2021-08-11 DIAGNOSIS — M48.02 CERVICAL SPINAL STENOSIS: Primary | ICD-10-CM

## 2021-08-11 LAB
ALBUMIN SERPL-MCNC: 2.5 G/DL (ref 3.5–5)
ALBUMIN/GLOB SERPL: 0.6 {RATIO} (ref 1.1–2.2)
ALP SERPL-CCNC: 86 U/L (ref 45–117)
ALT SERPL-CCNC: 12 U/L (ref 12–78)
AMPHET UR QL SCN: NEGATIVE
ANION GAP SERPL CALC-SCNC: 4 MMOL/L (ref 5–15)
APPEARANCE UR: ABNORMAL
AST SERPL-CCNC: 15 U/L (ref 15–37)
BACTERIA URNS QL MICRO: ABNORMAL /HPF
BARBITURATES UR QL SCN: NEGATIVE
BASOPHILS # BLD: 0 K/UL (ref 0–0.1)
BASOPHILS NFR BLD: 0 % (ref 0–1)
BENZODIAZ UR QL: NEGATIVE
BILIRUB SERPL-MCNC: 0.6 MG/DL (ref 0.2–1)
BILIRUB UR QL CFM: NEGATIVE
BUN SERPL-MCNC: 31 MG/DL (ref 6–20)
BUN/CREAT SERPL: 39 (ref 12–20)
CALCIUM SERPL-MCNC: 8.9 MG/DL (ref 8.5–10.1)
CANNABINOIDS UR QL SCN: NEGATIVE
CHLORIDE SERPL-SCNC: 101 MMOL/L (ref 97–108)
CO2 SERPL-SCNC: 32 MMOL/L (ref 21–32)
COCAINE UR QL SCN: NEGATIVE
COLOR UR: ABNORMAL
COMMENT, HOLDF: NORMAL
COMMENT, HOLDF: NORMAL
CREAT SERPL-MCNC: 0.8 MG/DL (ref 0.7–1.3)
DIFFERENTIAL METHOD BLD: ABNORMAL
DRUG SCRN COMMENT,DRGCM: NORMAL
EOSINOPHIL # BLD: 0 K/UL (ref 0–0.4)
EOSINOPHIL NFR BLD: 0 % (ref 0–7)
EPITH CASTS URNS QL MICRO: ABNORMAL /LPF
ERYTHROCYTE [DISTWIDTH] IN BLOOD BY AUTOMATED COUNT: 16 % (ref 11.5–14.5)
GLOBULIN SER CALC-MCNC: 4.3 G/DL (ref 2–4)
GLUCOSE BLD STRIP.AUTO-MCNC: 82 MG/DL (ref 65–117)
GLUCOSE SERPL-MCNC: 93 MG/DL (ref 65–100)
GLUCOSE UR STRIP.AUTO-MCNC: NEGATIVE MG/DL
HCT VFR BLD AUTO: 38 % (ref 36.6–50.3)
HGB BLD-MCNC: 11.9 G/DL (ref 12.1–17)
HGB UR QL STRIP: ABNORMAL
IMM GRANULOCYTES # BLD AUTO: 0.1 K/UL (ref 0–0.04)
IMM GRANULOCYTES NFR BLD AUTO: 1 % (ref 0–0.5)
KETONES UR QL STRIP.AUTO: NEGATIVE MG/DL
LACTATE SERPL-SCNC: 2 MMOL/L (ref 0.4–2)
LEUKOCYTE ESTERASE UR QL STRIP.AUTO: ABNORMAL
LYMPHOCYTES # BLD: 2.6 K/UL (ref 0.8–3.5)
LYMPHOCYTES NFR BLD: 25 % (ref 12–49)
MCH RBC QN AUTO: 30.3 PG (ref 26–34)
MCHC RBC AUTO-ENTMCNC: 31.3 G/DL (ref 30–36.5)
MCV RBC AUTO: 96.7 FL (ref 80–99)
METHADONE UR QL: NEGATIVE
MONOCYTES # BLD: 1.1 K/UL (ref 0–1)
MONOCYTES NFR BLD: 10 % (ref 5–13)
MUCOUS THREADS URNS QL MICRO: ABNORMAL /LPF
NEUTS SEG # BLD: 6.8 K/UL (ref 1.8–8)
NEUTS SEG NFR BLD: 64 % (ref 32–75)
NITRITE UR QL STRIP.AUTO: POSITIVE
NRBC # BLD: 0 K/UL (ref 0–0.01)
NRBC BLD-RTO: 0 PER 100 WBC
OPIATES UR QL: NEGATIVE
PCP UR QL: NEGATIVE
PH UR STRIP: 5 [PH] (ref 5–8)
PLATELET # BLD AUTO: 228 K/UL (ref 150–400)
PMV BLD AUTO: 9.9 FL (ref 8.9–12.9)
POTASSIUM SERPL-SCNC: 3.8 MMOL/L (ref 3.5–5.1)
PROT SERPL-MCNC: 6.8 G/DL (ref 6.4–8.2)
PROT UR STRIP-MCNC: 100 MG/DL
RBC # BLD AUTO: 3.93 M/UL (ref 4.1–5.7)
RBC #/AREA URNS HPF: ABNORMAL /HPF (ref 0–5)
SAMPLES BEING HELD,HOLD: NORMAL
SAMPLES BEING HELD,HOLD: NORMAL
SERVICE CMNT-IMP: NORMAL
SODIUM SERPL-SCNC: 137 MMOL/L (ref 136–145)
SP GR UR REFRACTOMETRY: 1.02 (ref 1–1.03)
UR CULT HOLD, URHOLD: NORMAL
UROBILINOGEN UR QL STRIP.AUTO: 0.2 EU/DL (ref 0.2–1)
WBC # BLD AUTO: 10.6 K/UL (ref 4.1–11.1)
WBC URNS QL MICRO: >100 /HPF (ref 0–4)

## 2021-08-11 PROCEDURE — 74011250636 HC RX REV CODE- 250/636: Performed by: EMERGENCY MEDICINE

## 2021-08-11 PROCEDURE — 87186 SC STD MICRODIL/AGAR DIL: CPT

## 2021-08-11 PROCEDURE — 36415 COLL VENOUS BLD VENIPUNCTURE: CPT

## 2021-08-11 PROCEDURE — 87086 URINE CULTURE/COLONY COUNT: CPT

## 2021-08-11 PROCEDURE — 65270000032 HC RM SEMIPRIVATE

## 2021-08-11 PROCEDURE — 81001 URINALYSIS AUTO W/SCOPE: CPT

## 2021-08-11 PROCEDURE — 83605 ASSAY OF LACTIC ACID: CPT

## 2021-08-11 PROCEDURE — 74011000258 HC RX REV CODE- 258: Performed by: EMERGENCY MEDICINE

## 2021-08-11 PROCEDURE — 74011250636 HC RX REV CODE- 250/636: Performed by: INTERNAL MEDICINE

## 2021-08-11 PROCEDURE — APPNB60 APP NON BILLABLE TIME 46-60 MINS: Performed by: NURSE PRACTITIONER

## 2021-08-11 PROCEDURE — 71045 X-RAY EXAM CHEST 1 VIEW: CPT

## 2021-08-11 PROCEDURE — 51798 US URINE CAPACITY MEASURE: CPT

## 2021-08-11 PROCEDURE — 80307 DRUG TEST PRSMV CHEM ANLYZR: CPT

## 2021-08-11 PROCEDURE — 70450 CT HEAD/BRAIN W/O DYE: CPT

## 2021-08-11 PROCEDURE — 93005 ELECTROCARDIOGRAM TRACING: CPT

## 2021-08-11 PROCEDURE — 82962 GLUCOSE BLOOD TEST: CPT

## 2021-08-11 PROCEDURE — 85025 COMPLETE CBC W/AUTO DIFF WBC: CPT

## 2021-08-11 PROCEDURE — 99285 EMERGENCY DEPT VISIT HI MDM: CPT

## 2021-08-11 PROCEDURE — 87040 BLOOD CULTURE FOR BACTERIA: CPT

## 2021-08-11 PROCEDURE — 74011250637 HC RX REV CODE- 250/637: Performed by: INTERNAL MEDICINE

## 2021-08-11 PROCEDURE — 87077 CULTURE AEROBIC IDENTIFY: CPT

## 2021-08-11 PROCEDURE — 80053 COMPREHEN METABOLIC PANEL: CPT

## 2021-08-11 RX ORDER — LANOLIN ALCOHOL/MO/W.PET/CERES
3 CREAM (GRAM) TOPICAL
Status: DISCONTINUED | OUTPATIENT
Start: 2021-08-11 | End: 2021-08-18 | Stop reason: HOSPADM

## 2021-08-11 RX ORDER — ONDANSETRON 2 MG/ML
4 INJECTION INTRAMUSCULAR; INTRAVENOUS
Status: DISCONTINUED | OUTPATIENT
Start: 2021-08-11 | End: 2021-08-18 | Stop reason: HOSPADM

## 2021-08-11 RX ORDER — BACLOFEN 10 MG/1
10 TABLET ORAL 4 TIMES DAILY
Status: DISCONTINUED | OUTPATIENT
Start: 2021-08-11 | End: 2021-08-16

## 2021-08-11 RX ORDER — SIMETHICONE 80 MG
80 TABLET,CHEWABLE ORAL
COMMUNITY

## 2021-08-11 RX ORDER — METFORMIN HYDROCHLORIDE 500 MG/1
500 TABLET ORAL 2 TIMES DAILY
Status: DISCONTINUED | OUTPATIENT
Start: 2021-08-11 | End: 2021-08-11

## 2021-08-11 RX ORDER — NYSTATIN 100000 U/G
OINTMENT TOPICAL 2 TIMES DAILY
Status: DISCONTINUED | OUTPATIENT
Start: 2021-08-11 | End: 2021-08-18 | Stop reason: HOSPADM

## 2021-08-11 RX ORDER — LANOLIN ALCOHOL/MO/W.PET/CERES
400 CREAM (GRAM) TOPICAL DAILY
Status: DISCONTINUED | OUTPATIENT
Start: 2021-08-12 | End: 2021-08-18 | Stop reason: HOSPADM

## 2021-08-11 RX ORDER — AMOXICILLIN 250 MG
CAPSULE ORAL DAILY
COMMUNITY

## 2021-08-11 RX ORDER — ALBUTEROL SULFATE 0.83 MG/ML
1.25 SOLUTION RESPIRATORY (INHALATION)
Status: DISCONTINUED | OUTPATIENT
Start: 2021-08-11 | End: 2021-08-18 | Stop reason: HOSPADM

## 2021-08-11 RX ORDER — LANOLIN ALCOHOL/MO/W.PET/CERES
400 CREAM (GRAM) TOPICAL DAILY
COMMUNITY

## 2021-08-11 RX ORDER — LANOLIN ALCOHOL/MO/W.PET/CERES
CREAM (GRAM) TOPICAL
COMMUNITY

## 2021-08-11 RX ORDER — LIDOCAINE 40 MG/G
CREAM TOPICAL DAILY
COMMUNITY

## 2021-08-11 RX ORDER — ENOXAPARIN SODIUM 100 MG/ML
40 INJECTION SUBCUTANEOUS DAILY
Status: DISCONTINUED | OUTPATIENT
Start: 2021-08-12 | End: 2021-08-18 | Stop reason: HOSPADM

## 2021-08-11 RX ORDER — UREA 10 %
100 LOTION (ML) TOPICAL DAILY
Status: DISCONTINUED | OUTPATIENT
Start: 2021-08-12 | End: 2021-08-18 | Stop reason: HOSPADM

## 2021-08-11 RX ORDER — FACIAL-BODY WIPES
10 EACH TOPICAL DAILY
COMMUNITY

## 2021-08-11 RX ORDER — LORATADINE 10 MG/1
10 TABLET ORAL
COMMUNITY

## 2021-08-11 RX ORDER — ACETAMINOPHEN 500 MG
500 TABLET ORAL
Status: DISCONTINUED | OUTPATIENT
Start: 2021-08-11 | End: 2021-08-11 | Stop reason: SDUPTHER

## 2021-08-11 RX ORDER — SODIUM CHLORIDE 0.9 % (FLUSH) 0.9 %
5-40 SYRINGE (ML) INJECTION AS NEEDED
Status: DISCONTINUED | OUTPATIENT
Start: 2021-08-11 | End: 2021-08-18 | Stop reason: HOSPADM

## 2021-08-11 RX ORDER — ONDANSETRON 4 MG/1
4 TABLET, ORALLY DISINTEGRATING ORAL
Status: DISCONTINUED | OUTPATIENT
Start: 2021-08-11 | End: 2021-08-18 | Stop reason: HOSPADM

## 2021-08-11 RX ORDER — SODIUM CHLORIDE, SODIUM LACTATE, POTASSIUM CHLORIDE, CALCIUM CHLORIDE 600; 310; 30; 20 MG/100ML; MG/100ML; MG/100ML; MG/100ML
75 INJECTION, SOLUTION INTRAVENOUS CONTINUOUS
Status: DISCONTINUED | OUTPATIENT
Start: 2021-08-11 | End: 2021-08-13

## 2021-08-11 RX ORDER — CETIRIZINE HCL 10 MG
10 TABLET ORAL DAILY
Status: DISCONTINUED | OUTPATIENT
Start: 2021-08-12 | End: 2021-08-18 | Stop reason: HOSPADM

## 2021-08-11 RX ORDER — ATORVASTATIN CALCIUM 20 MG/1
20 TABLET, FILM COATED ORAL DAILY
COMMUNITY

## 2021-08-11 RX ORDER — PANTOPRAZOLE SODIUM 40 MG/1
40 TABLET, DELAYED RELEASE ORAL
Status: DISCONTINUED | OUTPATIENT
Start: 2021-08-12 | End: 2021-08-18 | Stop reason: HOSPADM

## 2021-08-11 RX ORDER — LIDOCAINE 40 MG/G
CREAM TOPICAL DAILY
Status: DISCONTINUED | OUTPATIENT
Start: 2021-08-12 | End: 2021-08-18 | Stop reason: HOSPADM

## 2021-08-11 RX ORDER — ONDANSETRON 4 MG/1
4 TABLET, ORALLY DISINTEGRATING ORAL
COMMUNITY

## 2021-08-11 RX ORDER — FACIAL-BODY WIPES
10 EACH TOPICAL DAILY
Status: DISCONTINUED | OUTPATIENT
Start: 2021-08-12 | End: 2021-08-18 | Stop reason: HOSPADM

## 2021-08-11 RX ORDER — CALCIUM CARBONATE 200(500)MG
1 TABLET,CHEWABLE ORAL 3 TIMES DAILY
COMMUNITY

## 2021-08-11 RX ORDER — SODIUM CHLORIDE 0.9 % (FLUSH) 0.9 %
5-40 SYRINGE (ML) INJECTION EVERY 8 HOURS
Status: DISCONTINUED | OUTPATIENT
Start: 2021-08-11 | End: 2021-08-18 | Stop reason: HOSPADM

## 2021-08-11 RX ORDER — OMEPRAZOLE 40 MG/1
40 CAPSULE, DELAYED RELEASE ORAL DAILY
COMMUNITY

## 2021-08-11 RX ORDER — ATORVASTATIN CALCIUM 20 MG/1
20 TABLET, FILM COATED ORAL DAILY
Status: DISCONTINUED | OUTPATIENT
Start: 2021-08-12 | End: 2021-08-18 | Stop reason: HOSPADM

## 2021-08-11 RX ORDER — BACLOFEN 10 MG/1
10 TABLET ORAL 4 TIMES DAILY
COMMUNITY
End: 2021-08-18

## 2021-08-11 RX ORDER — FAMOTIDINE 20 MG/1
20 TABLET, FILM COATED ORAL
COMMUNITY

## 2021-08-11 RX ORDER — AMOXICILLIN 250 MG
2 CAPSULE ORAL DAILY
Status: DISCONTINUED | OUTPATIENT
Start: 2021-08-12 | End: 2021-08-18 | Stop reason: HOSPADM

## 2021-08-11 RX ORDER — ACETAMINOPHEN 650 MG/1
650 SUPPOSITORY RECTAL
Status: DISCONTINUED | OUTPATIENT
Start: 2021-08-11 | End: 2021-08-18 | Stop reason: HOSPADM

## 2021-08-11 RX ORDER — LEVOTHYROXINE SODIUM 125 UG/1
125 TABLET ORAL
Status: DISCONTINUED | OUTPATIENT
Start: 2021-08-12 | End: 2021-08-18

## 2021-08-11 RX ORDER — CALCIUM CARBONATE 200(500)MG
200 TABLET,CHEWABLE ORAL 3 TIMES DAILY
Status: DISCONTINUED | OUTPATIENT
Start: 2021-08-11 | End: 2021-08-18 | Stop reason: HOSPADM

## 2021-08-11 RX ORDER — ONDANSETRON 4 MG/1
4 TABLET, ORALLY DISINTEGRATING ORAL
Status: DISCONTINUED | OUTPATIENT
Start: 2021-08-11 | End: 2021-08-11 | Stop reason: SDUPTHER

## 2021-08-11 RX ORDER — NYSTATIN 100000 U/G
OINTMENT TOPICAL 2 TIMES DAILY
COMMUNITY

## 2021-08-11 RX ORDER — POLYETHYLENE GLYCOL 3350 17 G/17G
17 POWDER, FOR SOLUTION ORAL DAILY PRN
Status: DISCONTINUED | OUTPATIENT
Start: 2021-08-11 | End: 2021-08-18 | Stop reason: HOSPADM

## 2021-08-11 RX ORDER — BACLOFEN 10 MG/1
TABLET ORAL 3 TIMES DAILY
COMMUNITY
End: 2021-08-18

## 2021-08-11 RX ORDER — ACETAMINOPHEN 325 MG/1
TABLET ORAL
COMMUNITY

## 2021-08-11 RX ORDER — ACETAMINOPHEN 325 MG/1
650 TABLET ORAL
Status: DISCONTINUED | OUTPATIENT
Start: 2021-08-11 | End: 2021-08-18 | Stop reason: HOSPADM

## 2021-08-11 RX ADMIN — CEFTRIAXONE SODIUM 1 G: 1 INJECTION, POWDER, FOR SOLUTION INTRAMUSCULAR; INTRAVENOUS at 14:37

## 2021-08-11 RX ADMIN — Medication 3 MG: at 21:28

## 2021-08-11 RX ADMIN — SODIUM CHLORIDE, POTASSIUM CHLORIDE, SODIUM LACTATE AND CALCIUM CHLORIDE 75 ML/HR: 600; 310; 30; 20 INJECTION, SOLUTION INTRAVENOUS at 15:23

## 2021-08-11 RX ADMIN — Medication 10 ML: at 14:37

## 2021-08-11 RX ADMIN — CALCIUM CARBONATE (ANTACID) CHEW TAB 500 MG 200 MG: 500 CHEW TAB at 21:28

## 2021-08-11 RX ADMIN — BACLOFEN 10 MG: 10 TABLET ORAL at 21:28

## 2021-08-11 NOTE — H&P
[address] [facility] Adult  Hospitalist Group  History and Physical    Primary Care Provider: Maribell Kiser MD  Date of Service:  8/11/2021      Chief Complaint:    Altered mental status, hallucinations    HPI:    This is a 71-year gentleman with past medical history of tetraplegia 2/2  spinal cord injury, C5 AIS B following GLF, after surgery for cervical stenosis on 4/15/21 at [facility]. Patient now has autonomic dysreflexia, neurogenic bowel and bladder and has been bedbound, [facility]. He recently finished course of antibiotics for UTI. Patient patient is now brought for evaluation of confusion, hallucinations. As per daughter, patient is oriented x3 at baseline, however since last 4 days he has been confused and for last 2 days he has been having visual and auditory hallucinations. Daughter also reported decreased appetite. On my evaluation, patient was sleeping, he was arousable on painful stimuli, slowly started responding, was able to tell me the year, location and name of daughter correctly. On arrival to the ED patient was afebrile, his blood pressure was 117/57, heart rate was 71, patient is now saturating 98% on room air. Blood work showed WBC count of 10.6, hemoglobin 11.9, platelet 094, BMP showed BUN of 38, creatinine 0.8, normal electrolytes, UA showed WBC greater than 100, bacteria 1+, nitrite positive. UDS was negative. Patient was given a dose of ceftriaxone in the ED and hospitalist team was consulted for admission. Past medical history: Coronary artery disease, not on DAPT         Review of Systems:   As per HPI, all other systems are reviewed and are negative.                  Physical Examination      Visit Vitals  BP (!) 90/54   Pulse 63   Temp 97.9 °F (36.6 °C)   Resp 16   SpO2 99%         General: Not in any distress  Eyes: No pallor, no scleral icterus  ENT: No external deformity of ear and nose, moist oral mucosa  Resp: CTA Bilaterally   CV: S1+S2, No MGR  Abdomen: Soft, non tender  Neuro: On my evaluation, patient was sleeping, he was arousable on painful stimuli, slowly started responding, was able to tell me the year, location and name of daughter correctly. Patient was able to lift both upper extremities slightly above the ground on painful stimuli, unable lower extremities. Musculoskeletal: No Edema present, no wounds present   Psych: Calm, cooperative     Data Review: All diagnostic labs and studies have been reviewed. Assessment and Plan      Altered mental status secondary to UTI  Tetraplegia 2/2  spinal cord injury, C5 AIS B following GLF, after surgery for cervical stenosis on 4/15/21 at [facility]. Autonomic dysreflexia, neurogenic bowel and bladder  CAD  Hypothyroidism  HLD  GERD     Urine cultures obtained in the ED, will continue ceftriaxone 1 g daily, will start gentle hydration for 1 day with 75 cc of LR.  PT/OT  Continue c-collar for now, has follow-up appointment with [facility] neurosurgery on 8/17  Patient is on scheduled straight catheterization every 6 hours, continue that  Continue bowel regimen  Continue Baclofen for muscle spasms  Resume Levothyroxine, Statin, PPI    Full Code  : [place](Daughter) [phone]      Please note that this dictation was completed with kontoblick, the [employer] voice recognition software. Quite often unanticipated grammatical, syntax, homophones, and other interpretive errors are inadvertently transcribed by the computer software. Please disregard these errors. Please excuse any errors that have escaped final proofreading.     Signed By: Ann Gonzáles MD     August 11, 2021

## 2021-08-11 NOTE — ED TRIAGE NOTES
Triage Note: Patient is coming in from [facility] with AMS and UTI. Patient was diagnosed with UTI on Monday that was diagnosed as E COLI. Patient found to have low oxygen levels and wet cough.

## 2021-08-11 NOTE — ED NOTES
Bedside shift change report given to Isamar Johnson and Lady Koehler RN (oncoming nurse) by Francheska Erwin (offgoing nurse). Report included the following information SBAR.

## 2021-08-11 NOTE — PROGRESS NOTES
Occupational Therapy Screening:  Services maybe indicated at this time. [facility] therapy based on results obtained during the nursing admission assessment. The patients chart was reviewed . Please order a consult for occupational therapy if patient has had a decline in function from baseline and you would like an evaluation to be completed. Thank you.

## 2021-08-11 NOTE — ED NOTES
TRANSFER - OUT REPORT:    Verbal report given to Tere(name) pillo Pang  being transferred to (unit) for routine progression of care       Report consisted of patients Situation, Background, Assessment and   Recommendations(SBAR). Information from the following report(s) SBAR, Kardex and MAR was reviewed with the receiving nurse. Lines:   Peripheral IV 08/11/21 Right Antecubital (Active)   Site Assessment Clean, dry, & intact 08/11/21 1045   Phlebitis Assessment 0 08/11/21 1045   Dressing Status Clean, dry, & intact 08/11/21 1045   Dressing Type Transparent 08/11/21 1045   Hub Color/Line Status Pink 08/11/21 1045        Opportunity for questions and clarification was provided.       Patient transported with:   [employer]

## 2021-08-12 ENCOUNTER — APPOINTMENT (OUTPATIENT)
Dept: MRI IMAGING | Age: 71
DRG: 064 | End: 2021-08-12
Attending: NURSE PRACTITIONER
Payer: MEDICARE

## 2021-08-12 LAB
ANION GAP SERPL CALC-SCNC: 6 MMOL/L (ref 5–15)
BUN SERPL-MCNC: 31 MG/DL (ref 6–20)
BUN/CREAT SERPL: 55 (ref 12–20)
CALCIUM SERPL-MCNC: 9.1 MG/DL (ref 8.5–10.1)
CHLORIDE SERPL-SCNC: 103 MMOL/L (ref 97–108)
CK MB CFR SERPL CALC: 2.1 % (ref 0–2.5)
CK MB SERPL-MCNC: 1.5 NG/ML (ref 5–25)
CK SERPL-CCNC: 71 U/L (ref 39–308)
CO2 SERPL-SCNC: 30 MMOL/L (ref 21–32)
CREAT SERPL-MCNC: 0.56 MG/DL (ref 0.7–1.3)
ERYTHROCYTE [DISTWIDTH] IN BLOOD BY AUTOMATED COUNT: 15.9 % (ref 11.5–14.5)
GLUCOSE BLD STRIP.AUTO-MCNC: 83 MG/DL (ref 65–117)
GLUCOSE SERPL-MCNC: 73 MG/DL (ref 65–100)
HCT VFR BLD AUTO: 33.1 % (ref 36.6–50.3)
HGB BLD-MCNC: 10.8 G/DL (ref 12.1–17)
MAGNESIUM SERPL-MCNC: 1.7 MG/DL (ref 1.6–2.4)
MCH RBC QN AUTO: 30.9 PG (ref 26–34)
MCHC RBC AUTO-ENTMCNC: 32.6 G/DL (ref 30–36.5)
MCV RBC AUTO: 94.6 FL (ref 80–99)
NRBC # BLD: 0 K/UL (ref 0–0.01)
NRBC BLD-RTO: 0 PER 100 WBC
PLATELET # BLD AUTO: 197 K/UL (ref 150–400)
PMV BLD AUTO: 9.5 FL (ref 8.9–12.9)
POTASSIUM SERPL-SCNC: 3.5 MMOL/L (ref 3.5–5.1)
RBC # BLD AUTO: 3.5 M/UL (ref 4.1–5.7)
SERVICE CMNT-IMP: NORMAL
SODIUM SERPL-SCNC: 139 MMOL/L (ref 136–145)
TROPONIN I SERPL-MCNC: <0.05 NG/ML
WBC # BLD AUTO: 10.7 K/UL (ref 4.1–11.1)

## 2021-08-12 PROCEDURE — 74011250636 HC RX REV CODE- 250/636: Performed by: INTERNAL MEDICINE

## 2021-08-12 PROCEDURE — 80048 BASIC METABOLIC PNL TOTAL CA: CPT

## 2021-08-12 PROCEDURE — 77010033678 HC OXYGEN DAILY

## 2021-08-12 PROCEDURE — 82962 GLUCOSE BLOOD TEST: CPT

## 2021-08-12 PROCEDURE — 70544 MR ANGIOGRAPHY HEAD W/O DYE: CPT

## 2021-08-12 PROCEDURE — 77030037877 HC DRSG MEPILEX >48IN BORD MOLN -A

## 2021-08-12 PROCEDURE — 77030041076 HC DRSG AG OPTICELL MDII -A

## 2021-08-12 PROCEDURE — 85027 COMPLETE CBC AUTOMATED: CPT

## 2021-08-12 PROCEDURE — 74011250637 HC RX REV CODE- 250/637: Performed by: INTERNAL MEDICINE

## 2021-08-12 PROCEDURE — 77030038554 HC DRSG WND THERAHONEY MDII -Z

## 2021-08-12 PROCEDURE — 2709999900 HC NON-CHARGEABLE SUPPLY

## 2021-08-12 PROCEDURE — 99223 1ST HOSP IP/OBS HIGH 75: CPT | Performed by: PSYCHIATRY & NEUROLOGY

## 2021-08-12 PROCEDURE — 36415 COLL VENOUS BLD VENIPUNCTURE: CPT

## 2021-08-12 PROCEDURE — 82550 ASSAY OF CK (CPK): CPT

## 2021-08-12 PROCEDURE — 65660000000 HC RM CCU STEPDOWN

## 2021-08-12 PROCEDURE — 74011250637 HC RX REV CODE- 250/637: Performed by: FAMILY MEDICINE

## 2021-08-12 PROCEDURE — 84484 ASSAY OF TROPONIN QUANT: CPT

## 2021-08-12 PROCEDURE — 74011000258 HC RX REV CODE- 258: Performed by: INTERNAL MEDICINE

## 2021-08-12 PROCEDURE — 83735 ASSAY OF MAGNESIUM: CPT

## 2021-08-12 PROCEDURE — 51798 US URINE CAPACITY MEASURE: CPT

## 2021-08-12 PROCEDURE — 74011000250 HC RX REV CODE- 250: Performed by: INTERNAL MEDICINE

## 2021-08-12 PROCEDURE — 70551 MRI BRAIN STEM W/O DYE: CPT

## 2021-08-12 RX ORDER — BALSAM PERU/CASTOR OIL
OINTMENT (GRAM) TOPICAL EVERY 8 HOURS
Status: DISCONTINUED | OUTPATIENT
Start: 2021-08-12 | End: 2021-08-18 | Stop reason: HOSPADM

## 2021-08-12 RX ORDER — GUAIFENESIN 100 MG/5ML
81 LIQUID (ML) ORAL DAILY
Status: DISCONTINUED | OUTPATIENT
Start: 2021-08-13 | End: 2021-08-18 | Stop reason: HOSPADM

## 2021-08-12 RX ADMIN — Medication 10 ML: at 16:16

## 2021-08-12 RX ADMIN — Medication: at 21:11

## 2021-08-12 RX ADMIN — ACETAMINOPHEN 650 MG: 325 TABLET ORAL at 21:11

## 2021-08-12 RX ADMIN — BACLOFEN 10 MG: 10 TABLET ORAL at 13:00

## 2021-08-12 RX ADMIN — CEFTRIAXONE SODIUM 1 G: 1 INJECTION, POWDER, FOR SOLUTION INTRAMUSCULAR; INTRAVENOUS at 16:13

## 2021-08-12 RX ADMIN — CALCIUM CARBONATE (ANTACID) CHEW TAB 500 MG 200 MG: 500 CHEW TAB at 21:11

## 2021-08-12 RX ADMIN — PANTOPRAZOLE SODIUM 40 MG: 40 TABLET, DELAYED RELEASE ORAL at 07:07

## 2021-08-12 RX ADMIN — ATORVASTATIN CALCIUM 20 MG: 20 TABLET, FILM COATED ORAL at 10:02

## 2021-08-12 RX ADMIN — BACLOFEN 10 MG: 10 TABLET ORAL at 18:00

## 2021-08-12 RX ADMIN — BACLOFEN 10 MG: 10 TABLET ORAL at 10:01

## 2021-08-12 RX ADMIN — Medication 400 MG: at 10:02

## 2021-08-12 RX ADMIN — ENOXAPARIN SODIUM 40 MG: 40 INJECTION SUBCUTANEOUS at 10:02

## 2021-08-12 RX ADMIN — PANTOPRAZOLE SODIUM 40 MG: 40 TABLET, DELAYED RELEASE ORAL at 16:14

## 2021-08-12 RX ADMIN — BACLOFEN 10 MG: 10 TABLET ORAL at 21:11

## 2021-08-12 RX ADMIN — CETIRIZINE HYDROCHLORIDE 10 MG: 10 TABLET, FILM COATED ORAL at 10:02

## 2021-08-12 RX ADMIN — Medication 3 MG: at 21:11

## 2021-08-12 RX ADMIN — LIDOCAINE 4%: 4 CREAM TOPICAL at 10:00

## 2021-08-12 RX ADMIN — LEVOTHYROXINE SODIUM 125 MCG: 0.12 TABLET ORAL at 07:30

## 2021-08-12 RX ADMIN — Medication 100 MCG: at 10:02

## 2021-08-12 RX ADMIN — CALCIUM CARBONATE (ANTACID) CHEW TAB 500 MG 200 MG: 500 CHEW TAB at 16:14

## 2021-08-12 RX ADMIN — DOCUSATE SODIUM 50 MG AND SENNOSIDES 8.6 MG 2 TABLET: 8.6; 5 TABLET, FILM COATED ORAL at 10:01

## 2021-08-12 RX ADMIN — BISACODYL 10 MG: 10 SUPPOSITORY RECTAL at 10:02

## 2021-08-12 RX ADMIN — Medication: at 16:14

## 2021-08-12 RX ADMIN — CALCIUM CARBONATE (ANTACID) CHEW TAB 500 MG 200 MG: 500 CHEW TAB at 10:01

## 2021-08-12 RX ADMIN — NYSTATIN OINTMENT: 100000 OINTMENT TOPICAL at 18:00

## 2021-08-12 NOTE — PROGRESS NOTES
Physical Therapy Screening:    [facility] for [facility] therapy based on results obtained during the nursing admission assessment. The patients chart was reviewed and the patient is appropriate for a skilled therapy evaluation if there is a decline in functional mobility from baseline. Please order a consult for physical therapy if you are in agreement and would like an evaluation to be completed. Thank you.     Danny Magdaleno, PT

## 2021-08-12 NOTE — PROGRESS NOTES
During assessment this evening, patient presents with a left-sided facial droop and nonreactive left pupil. Code Stroke started at 2145. Genoveva NP, Marco Antonio Ibarra NP at bedside. Patient assisted to Stat CT by primary RN and Neuro RN at [phone], this RN was informed by on-call radiologist that the CT was negative from stroke. 2300 Orders placed for routine Neuro consult, routine MRI.

## 2021-08-12 NOTE — PROGRESS NOTES
Bedside shift change report given to Gibran Jacobs (oncoming nurse) by Jamila Lawrence (offgoing nurse). Report included the following information SBAR.

## 2021-08-12 NOTE — PROGRESS NOTES
0545 During attempt to straight catheterize patient this morning, patient begins to void spontaneously. Post void residual is 70 ccs.  Will continue to monitor

## 2021-08-12 NOTE — CONSULTS
NEUROLOGY   INPATIENT EVALUATION/CONSULTATION       PATIENT NAME: Lola Wesley    MRN: [id number]    REASON FOR CONSULTATION: s/p stroke alert    08/12/21      HISTORY OF PRESENT ILLNESS:  Lola Wesley is a 80 y.o. right-hand-dominant male who is currently essentially quadriplegic following a traumatic fall after cervical surgery in April and May respectively who presented from his long-term care facility with auditory and visual hallucinations. Patient reportedly was treated for UTI recently as he is straight caths regularly but on presentation urinalysis remain consistent with urinary tract infection. Likewise there was noted to be suggestive of ongoing hypothyroid tone. Was admitted for management of these issues and last night presumably when patient arrived on the floor was noted to have unequal pupils which are baseline as well as possible left facial weakness for which stroke alert was activated. At the time CT CTA were unremarkable, concern for left facial weakness was equivocal, patient was recommended for MRI and aspirin. Currently patient feels himself and does not feel anything is out of the ordinary, no hallucinations noted by nursing staff.     PAST MEDICAL HISTORY:  Past Medical History:   Diagnosis Date    CAD (coronary artery disease)     Essential hypertension     Hyperlipidemia     Stroke ([facility] [address]        PAST SURGICAL HISTORY:  Cervical neck surgery (April 2021, [facility])    FAMILY HISTORY:   No significant past family history noted      SOCIAL HISTORY:  Social History     Socioeconomic History    Marital status:      Spouse name: Not on file    Number of children: Not on file    Years of education: Not on file    Highest education level: Not on file   Tobacco Use    Smoking status: Former Smoker     Packs/day: 1.50     Years: 5.00     Pack years: 7.50     Types: Cigarettes    Smokeless tobacco: Never Used   Substance and Sexual Activity    Alcohol use: Not Currently     Alcohol/week: 0.0 standard drinks     Comment: rare    Drug use: No     Social Determinants of Health     Financial Resource Strain:     Difficulty of Paying Living Expenses:    Food Insecurity:     Worried About Running Out of Food in the Last Year:     [address] in the Last Year:    Transportation Needs:     Lack of Transportation (Medical):      Lack of Transportation (Non-Medical):    Physical Activity:     Days of Exercise per Week:     Minutes of Exercise per Session:    Stress:     Feeling of Stress :    Social Connections:     Frequency of Communication with Friends and Family:     Frequency of Social Gatherings with Friends and Family:     Attends Judaism Services:     Active Member of Clubs or Organizations:     Attends Club or Organization Meetings:     Marital Status:          MEDICATIONS:   Current Facility-Administered Medications   Medication Dose Route Frequency Provider Last Rate Last Admin    balsam peru-castor oiL (VENELEX) ointment   Topical Q8H More Rubio MD        sodium chloride (NS) flush 5-40 mL  5-40 mL IntraVENous Q8H Kd Spencer MD   10 mL at 08/11/21 1437    sodium chloride (NS) flush 5-40 mL  5-40 mL IntraVENous PRN Kd Spencer MD        acetaminophen (TYLENOL) tablet 650 mg  650 mg Oral Q6H PRN Kd Spencer MD        Or   [facility] acetaminophen (TYLENOL) suppository 650 mg  650 mg Rectal Q6H PRN Kd Spencer MD        polyethylene glycol (MIRALAX) packet 17 g  17 g Oral DAILY PRN Kd Spencer MD        [facility]dansetron [facility]) injection 4 mg  4 mg IntraVENous Q6H PRN Kd Spencer MD        enoxaparin (LOVENOX) injection 40 mg  40 mg SubCUTAneous DAILY Kd Spencer MD   40 mg at 08/12/21 1002    lactated Ringers infusion  75 mL/hr IntraVENous CONTINUOUS Kd Spencer MD 75 mL/hr at 08/11/21 1523 75 mL/hr at 08/11/21 1523    cefTRIAXone (ROCEPHIN) 1 g in 0.9% sodium chloride (MBP/ADV) 50 mL MBP  1 g IntraVENous Q24H Kd Spencer MD        albuterol (PROVENTIL VENTOLIN) nebulizer solution 1.25 mg  1.25 mg Nebulization Q6H PRN Jarrell Service, MD        atorvastatin (LIPITOR) tablet 20 mg  20 mg Oral DAILY Jarrell Service, MD   20 mg at 08/12/21 1002    baclofen (LIORESAL) tablet 10 mg  10 mg Oral QID Jarrell Service, MD   10 mg at 08/12/21 1001    bisacodyL (DULCOLAX) suppository 10 mg  10 mg Rectal DAILY Jarrell Service, MD   10 mg at 08/12/21 1002    calcium carbonate (TUMS) chewable tablet 200 mg [elemental]  200 mg Oral TID Jarrell Service, MD   200 mg at 08/12/21 1001    cyanocobalamin (VITAMIN B12) tablet 100 mcg  100 mcg Oral DAILY Jarrell Service, MD   100 mcg at 08/12/21 1002    levothyroxine (SYNTHROID) tablet 125 mcg  125 mcg Oral ACB Jarrell Service, MD   125 mcg at 08/12/21 0730    lidocaine (XYLOCAINE) 4 % cream   Topical DAILY Jarrell Service, MD        cetirizine (ZYRTEC) tablet 10 mg  10 mg Oral DAILY Jarrell Service, MD   10 mg at 08/12/21 1002    magnesium oxide (MAG-OX) tablet 400 mg  400 mg Oral DAILY Jarrell Service, MD   400 mg at 08/12/21 1002    melatonin tablet 3 mg  3 mg Oral QHS Jarrell Service, MD   3 mg at 08/11/21 2128    ondansetron (ZOFRAN ODT) tablet 4 mg  4 mg Oral Q4H PRN Jarrell Service, MD        pantoprazole (PROTONIX) tablet 40 mg  40 mg Oral ACB&D Jarrell Service, MD   40 mg at 08/12/21 0707    nitroglycerin (NITROBID) 2 % ointment 1 Inch  1 Inch Topical Q15MIN PRN Jarrell Service, MD        nystatin (MYCOSTATIN) 100,000 unit/gram ointment   Topical BID Jarrell Service, MD        senna-docusate (PERICOLACE) 8.6-50 mg per tablet 2 Tablet  2 Tablet Oral DAILY Jarrell Service, MD   2 Tablet at 08/12/21 1001         ALLERGIES:  Allergies   Allergen Reactions    Mavik [Trandolapril] Nausea and Vomiting    Sulfa Dyne Hives         REVIEW OF SYSTEMS:  10 point ROS reviewed with patient and negative except for those listed above.       PHYSICAL EXAM:  Vital Signs:   Visit Vitals  BP (!) 156/67   Pulse 68   Temp 98.7 °F (37.1 °C)   Resp 18   SpO2 94%     Pleasant male resting comfortably in bed in [place] cervical collar HEENT appears unremarkable other than anisocoria which is baseline slight flattening of nasolabial fold on the left. Cervical collar is in place. Cardiopulmonary exams are unremarkable. Neurologically patient appears alert and oriented attention appears intact speech is clear language is fluent. Patient does not appear to be attending to any internal/external stimuli. Cranials 2 through 12 appear unremarkable other than pupils pinpoint on the left, mid position on right. Motorically patient can flex and extend elbow across trunk on right weakly lift left arm off bed with approximately 1+ to 2 months out of 5 strength. Tone is increased in bilateral upper extremities. No movement is noted in bilateral lower extremity where tone is increased. Sensation appears grossly intact. Remainder examination is deferred. PERTINENT DATA:    CT Results (maximum last 3): Results from [place] encounter on 08/11/21    CT CODE NEURO HEAD WO CONTRAST    Narrative  EXAM: CT CODE NEURO HEAD WO CONTRAST    INDICATION: Code Stroke face droop    COMPARISON: None. CONTRAST: None. TECHNIQUE: Unenhanced CT of the head was performed using 5 mm images. Brain and  bone windows were generated. Coronal and sagittal reformats. CT dose reduction  was achieved through use of a standardized protocol tailored for this  examination and automatic exposure control for dose modulation. FINDINGS:  There is no extra-axial fluid collection, hemorrhage or shift. Impression  No acute findings. Result called to patient's nurse at time of  interpretation      Results from [place] encounter on 06/10/16    CT CHEST W CONT    Narrative  **Final Report**      ICD Codes / Adm. Diagnosis: 402.90  401.0 / Unspecified hypertensive heart  Essential hypertension, saud  Examination:  CT Renee Raymond CON  - [id number] - Thomas 10 2016 11: 49AM  Accession No:  [id number]  Reason:  lung nodules, chest pain      REPORT:  INDICATION:  lung nodules, chest pain    EXAM: Chest CT    Contrast: 90 mL Isovue 300. COMPARISON: None. FINDINGS: The lungs are clear. Central pulmonary arteries show normal  enhancement with no apparent thrombus. Aorta shows no calcification,  dissection or significant atherosclerosis. There is coronary artery  calcification. There is no adenopathy. There is no pleural or pericardial  effusion. There is no pneumothorax. Thoracic spine shows degenerative  osteophyte formation without compression fracture. Visualized upper abdomen  shows hepatic steatosis. Adrenals are not enlarged. Impression  : No pulmonary nodules. No acute findings. 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     RECOMMENDATIONS:  Stroke alert:  Patient appears to have very subtle flattening of left nasolabial fold as compared to right, during stroke alert patient was unsure whether face appeared different to him or not  Pupils are postsurgical and baseline not a new finding  Overall doubt that anything acutely neurologically happened to patient last night given overall context of events i.e. stroke alert was called for unequal pupils which were baseline when patient walked in the door)  Would follow-up on MRI and contact neurology if demonstrates significant findings otherwise would not treat as TIA    Please call questions concerns      Ritu Bravo MD

## 2021-08-12 NOTE — PROGRESS NOTES
Overnight Hospitalist Progress Note    Name: Idris Griffith  YOB: 1950  MRN: [id number]  Admission Date: 8/11/2021    Date of service: 08/11/21, 11:02 PM          ____________________________________________________________________________    Social call for new onset left facial droop unequal pupils right greater than left first noticed at 2145. Last known well was 2015. Patient is alert and oriented, pleasant cooperative. Has existing weakness due to spinal cord injury following a ground-level fall. Had surgery 4/15 2021 at [facility]. Now with autonomic dysreflexia. Dry CT with no acute bleed or findings of acute stroke    Teleneurology recommends MRI/MRA of the brain without contrast initiate aspirin if okay with neurosurgery. Patient follows with [facility] neurosurgery. Can obtain contact information from daughter Danni Aguayo at [phone]. Inpatient neuro consulted    Assessment, evaluation and assistance from neuro NP VALENTINE Valdez much appreciated      ____________________________________________________________________________    JACINTO Lo, RN, NP-C  [phone] or via [facility]

## 2021-08-12 NOTE — ROUTINE PROCESS
Primary Nurse Gagan Lim RN and Martin Burrell RN performed a dual skin assessment on this patient Impairment noted- see wound doc flow sheet  Nicolas score is 12    Patient has some redness on the sacrum and old wounds on the heels of his feet. Patient is wearing heel pressure release boots on bilateral feet.

## 2021-08-12 NOTE — PROGRESS NOTES
Neurocritical Care Code Stroke Documentation    Symptoms:   Anisocoria left pupil smaller (patient states is normal for him, left facial droop (patient is uncertain if this is normal for him)   Last Known Well: 8:15   Medical hx: Active Problems:    Altered mental status (2021)    Quadriplegia from spinal cord injury C5 AIS following    Anticoagulation: Lovenox,    VAN:   Negative   NIHSS:   1a-LOC:1    1b-Month/Age:0    1c-Open/Close Hand:0    2-Best Gaze:0    3-Visual Fields:0    4-Facial Palsy:2    5a-Left Arm:2 (baseline quadriplegic, minimal lift off of the bed bilateral arms. 5b-Right Arm:2    6a-Left Le    6b-Right Le    7-Limb Ataxia:0    8-Sensory:0    9-Best Language:0    10-Dysarthria:0, soft speaking    11-Extinction/Inattention:0  TOTAL SCORE:15 (the only new finding most likely left facial droop)   Imaging:   CT-No acute findings. Plan:   TPA Candidate: NO    Mechanical thrombectomy Candidate: NO  Teleneurology recommends MRI brain/MRA H/N, checking with Spinal Surgery and see if okay to start aspirin, and Neurology consult. Discussed with: Teleneurologist and Hospitalist NP    Arrival time:   Time spent: 60 minutes.      Ayaka Main NP  Neurocritical Care Nurse Practitioner  [phone]

## 2021-08-12 NOTE — WOUND CARE
WOCN Note:     New wound care consult placed to assess heels and sacrum    Chart shows:  Patient admitted on 8/11/21 with Altered mental status, UTI. Patient has hx of spinal cord injury and is quadriplegic  Past Medical History:   Diagnosis Date    CAD (coronary artery disease)     Essential hypertension     Hyperlipidemia     Stroke ([address]       Admitted from [facility]     8/12/21  WBC = 10.7  Hgb = 10.8  8/11/21  Albumin = 2.5    Assessment:   A&O x 4, Appropriately conversational   Mobility: total assistance with repositioning and turning  Continence: Incontinent of urine and stool. Wearing briefs   Last Nicolas Score: 12  Surface: total care mattress  Diet: Regular    Bilateral heel, ischium, sacrum, elbows, knees skin intact and without erythema   Heels offloaded with pillows   Cervical collar in place, skin assessed under collar with no areas of erythema, discoloration, or skin breakdown    1. POA Stage 1 pressure injury to coccyx   3 x 2 cm  Skin intact with nonblanching erythema    2. POA Unstagebale pressure injury to posterior right ankle   0.5 x 0.6 x <0.1 cm  Base is 100% gray slough   scant amount tan exudate  no odor   Well defined edges  Periwound intact & pink blanching  Treatment: Cleansed with saline, honey sheet, foam border    3. POA pressure injury to left posterior heel, initial stage unknown   2.4 x 1.6 cm, area with small scattered dry brown crusted (scabbed) areas  Periwound intact & without erythema  Treatment: cleansed with saline, petroleum jelly, fom border dressing    Wound Recommendations:      Venelex to sacrum/coccyx every 8 hours    Apply sacral foam border dressing to sacral/coccyx area for protection from friction/shearing and for pressure redistribution. Change every 3 days. Lift dressing every 8 hours to assess skin integrity and apply ordered Venelex ointment then reapply same dressing.  Discontinue urinary incontinence cannot be contained with quick wrap, condom cath, or PrimoFit. Cleanse right posterior ankle wound with normal saline, apply Honey Sheet, cover with foam border dressing,  every other day and prn if becomes soiled. Cleanse left posterior ankle with normal saline, apply Petroleum gauze, cover with foam border dressing, every other day and prn if becomes soiled      PI Prevention:  Turn/reposition approximately every 2 hours  Offload heels with pillows or heel suspension boots at all times in bed. Pad bony prominences   Keep HOB 30 degrees or less to decrease shearing and pressure unless medically contraindicated. If HOB is to be over 30 degrees, raise knees first then [facility] to prevent sliding   Minimize layers of linen/pads under patient to optimize support surface to one flat  sheet and one incontinence pad   Air mattress Prius with air module ordered today. Please call [employer] @  for bed to be picked up at discharge     Condom cath, PrimoFit urinary management system, or Quick Wrap incontinent wrap for urine containment to prevent moisture damage to skin    Orders received from Dr. Quintin Menendez  Discussed with RN,     Transition of Care: Plan to follow weekly and as needed while admitted to hospital    Ramesh Rodriguez MSN, RN, [facility], [facility]  Certified Wound and Ostomy Nurse  office [phone]  Can be reached through [employer]  pager [phone] or call  to page

## 2021-08-13 ENCOUNTER — APPOINTMENT (OUTPATIENT)
Dept: VASCULAR SURGERY | Age: 71
DRG: 064 | End: 2021-08-13
Attending: PSYCHIATRY & NEUROLOGY
Payer: MEDICARE

## 2021-08-13 ENCOUNTER — APPOINTMENT (OUTPATIENT)
Dept: NON INVASIVE DIAGNOSTICS | Age: 71
DRG: 064 | End: 2021-08-13
Attending: FAMILY MEDICINE
Payer: MEDICARE

## 2021-08-13 LAB
ATRIAL RATE: 67 BPM
CALCULATED P AXIS, ECG09: 43 DEGREES
CALCULATED R AXIS, ECG10: 23 DEGREES
CALCULATED T AXIS, ECG11: 71 DEGREES
CHOLEST SERPL-MCNC: 127 MG/DL
COMMENT, HOLDF: NORMAL
DIAGNOSIS, 93000: NORMAL
HDLC SERPL-MCNC: 50 MG/DL
HDLC SERPL: 2.5 {RATIO} (ref 0–5)
LDLC SERPL CALC-MCNC: 47.4 MG/DL (ref 0–100)
P-R INTERVAL, ECG05: 164 MS
Q-T INTERVAL, ECG07: 446 MS
QRS DURATION, ECG06: 102 MS
QTC CALCULATION (BEZET), ECG08: 471 MS
SAMPLES BEING HELD,HOLD: NORMAL
TRIGL SERPL-MCNC: 148 MG/DL (ref ?–150)
VENTRICULAR RATE, ECG03: 67 BPM
VLDLC SERPL CALC-MCNC: 29.6 MG/DL

## 2021-08-13 PROCEDURE — 36415 COLL VENOUS BLD VENIPUNCTURE: CPT

## 2021-08-13 PROCEDURE — 93306 TTE W/DOPPLER COMPLETE: CPT

## 2021-08-13 PROCEDURE — 77010033678 HC OXYGEN DAILY

## 2021-08-13 PROCEDURE — 51798 US URINE CAPACITY MEASURE: CPT

## 2021-08-13 PROCEDURE — 80061 LIPID PANEL: CPT

## 2021-08-13 PROCEDURE — 74011250636 HC RX REV CODE- 250/636: Performed by: INTERNAL MEDICINE

## 2021-08-13 PROCEDURE — 77030019905 HC CATH URETH INTMIT MDII -A

## 2021-08-13 PROCEDURE — 74011000258 HC RX REV CODE- 258: Performed by: FAMILY MEDICINE

## 2021-08-13 PROCEDURE — 93880 EXTRACRANIAL BILAT STUDY: CPT

## 2021-08-13 PROCEDURE — 65660000000 HC RM CCU STEPDOWN

## 2021-08-13 PROCEDURE — 99231 SBSQ HOSP IP/OBS SF/LOW 25: CPT | Performed by: PSYCHIATRY & NEUROLOGY

## 2021-08-13 PROCEDURE — 74011000258 HC RX REV CODE- 258: Performed by: INTERNAL MEDICINE

## 2021-08-13 PROCEDURE — 74011250637 HC RX REV CODE- 250/637: Performed by: INTERNAL MEDICINE

## 2021-08-13 PROCEDURE — 77030037877 HC DRSG MEPILEX >48IN BORD MOLN -A

## 2021-08-13 PROCEDURE — 74011250636 HC RX REV CODE- 250/636: Performed by: FAMILY MEDICINE

## 2021-08-13 PROCEDURE — 74011250637 HC RX REV CODE- 250/637: Performed by: FAMILY MEDICINE

## 2021-08-13 RX ADMIN — CETIRIZINE HYDROCHLORIDE 10 MG: 10 TABLET, FILM COATED ORAL at 11:15

## 2021-08-13 RX ADMIN — BACLOFEN 10 MG: 10 TABLET ORAL at 14:25

## 2021-08-13 RX ADMIN — NYSTATIN OINTMENT: 100000 OINTMENT TOPICAL at 18:31

## 2021-08-13 RX ADMIN — PANTOPRAZOLE SODIUM 40 MG: 40 TABLET, DELAYED RELEASE ORAL at 07:12

## 2021-08-13 RX ADMIN — ATORVASTATIN CALCIUM 20 MG: 20 TABLET, FILM COATED ORAL at 11:15

## 2021-08-13 RX ADMIN — SODIUM CHLORIDE, POTASSIUM CHLORIDE, SODIUM LACTATE AND CALCIUM CHLORIDE 75 ML/HR: 600; 310; 30; 20 INJECTION, SOLUTION INTRAVENOUS at 01:27

## 2021-08-13 RX ADMIN — BACLOFEN 10 MG: 10 TABLET ORAL at 11:15

## 2021-08-13 RX ADMIN — ASPIRIN 81 MG: 81 TABLET, CHEWABLE ORAL at 11:15

## 2021-08-13 RX ADMIN — CALCIUM CARBONATE (ANTACID) CHEW TAB 500 MG 200 MG: 500 CHEW TAB at 16:40

## 2021-08-13 RX ADMIN — Medication: at 14:29

## 2021-08-13 RX ADMIN — CALCIUM CARBONATE (ANTACID) CHEW TAB 500 MG 200 MG: 500 CHEW TAB at 11:15

## 2021-08-13 RX ADMIN — ENOXAPARIN SODIUM 40 MG: 40 INJECTION SUBCUTANEOUS at 11:14

## 2021-08-13 RX ADMIN — CEFTRIAXONE SODIUM 1 G: 1 INJECTION, POWDER, FOR SOLUTION INTRAMUSCULAR; INTRAVENOUS at 14:25

## 2021-08-13 RX ADMIN — MEROPENEM 500 MG: 500 INJECTION, POWDER, FOR SOLUTION INTRAVENOUS at 21:42

## 2021-08-13 RX ADMIN — PANTOPRAZOLE SODIUM 40 MG: 40 TABLET, DELAYED RELEASE ORAL at 16:39

## 2021-08-13 RX ADMIN — NYSTATIN OINTMENT: 100000 OINTMENT TOPICAL at 14:29

## 2021-08-13 RX ADMIN — ACETAMINOPHEN 650 MG: 325 TABLET ORAL at 03:20

## 2021-08-13 RX ADMIN — Medication 100 MCG: at 11:15

## 2021-08-13 RX ADMIN — Medication: at 07:12

## 2021-08-13 RX ADMIN — BACLOFEN 10 MG: 10 TABLET ORAL at 18:21

## 2021-08-13 RX ADMIN — LIDOCAINE 4%: 4 CREAM TOPICAL at 14:29

## 2021-08-13 RX ADMIN — Medication 400 MG: at 11:15

## 2021-08-13 RX ADMIN — LEVOTHYROXINE SODIUM 125 MCG: 0.12 TABLET ORAL at 07:12

## 2021-08-13 NOTE — PROGRESS NOTES
Bedside shift change report given to [id number] (oncoming nurse) by Tone Bhagat (offgoing nurse). Report included the following information SBAR.

## 2021-08-13 NOTE — PROGRESS NOTES
Transition of Care    RUR: 11%  Disposition: Return to SNF  Transport: stretcher    Reason for Admission:  Confusion, hallucinations                     RUR Score:   11%                  Plan for utilizing home health:      n/a    PCP: First and Last name:  Catalina Decker MD     Name of Practice:    Are you a current patient: Yes/No:    Approximate date of last visit:    Can you participate in a virtual visit with your PCP: no                    Current Advanced Directive/Advance Care Plan: Full Code      Healthcare Decision Maker:   Click here to complete [address] including selection of the Healthcare Decision Maker Relationship (ie \"Primary\")                             Transition of Care Plan:         Patient is currently receiving short term rehabilitation at [facility] and will return there when medically stable. Referral sent to them via [employer]. Care Management Interventions  PCP Verified by CM: Yes  Palliative Care Criteria Met (RRAT>21 & CHF Dx)?: No  MyChart Signup: No  Discharge Durable Medical Equipment: No  Health Maintenance Reviewed: Yes  Physical Therapy Consult: No  Occupational Therapy Consult: No  Speech Therapy Consult: No  Current Support Network: Nursing Facility  Confirm Follow Up Transport: Family  Discharge Location  Discharge Placement: Skilled nursing facility    S[employer] , [employer]

## 2021-08-13 NOTE — PROGRESS NOTES
Neurology Progress Note    Patient ID:  Angelita Asher  [id number]  89 y.o.  1950    Subjective:      No symptom events noted over the past 24 hours. MRI was obtained which demonstrates multifocal posterior circulation bilateral ischemic infarcts. Today patient is more notably confused trying to understand how his bed got in his current room thinking he was in another room. Daughter also mentions that when the windows open he thinks he is in the parking garage.     Current Facility-Administered Medications   Medication Dose Route Frequency    meropenem (MERREM) 500 mg in 0.9% sodium chloride (MBP/ADV) 50 mL MBP  0.5 g IntraVENous Q12H    balsam peru-castor oiL (VENELEX) ointment   Topical Q8H    aspirin chewable tablet 81 mg  81 mg Oral DAILY    sodium chloride (NS) flush 5-40 mL  5-40 mL IntraVENous Q8H    sodium chloride (NS) flush 5-40 mL  5-40 mL IntraVENous PRN    acetaminophen (TYLENOL) tablet 650 mg  650 mg Oral Q6H PRN    Or    acetaminophen (TYLENOL) suppository 650 mg  650 mg Rectal Q6H PRN    polyethylene glycol (MIRALAX) packet 17 g  17 g Oral DAILY PRN    ondansetron (ZOFRAN) injection 4 mg  4 mg IntraVENous Q6H PRN    enoxaparin (LOVENOX) injection 40 mg  40 mg SubCUTAneous DAILY    albuterol (PROVENTIL VENTOLIN) nebulizer solution 1.25 mg  1.25 mg Nebulization Q6H PRN    atorvastatin (LIPITOR) tablet 20 mg  20 mg Oral DAILY    baclofen (LIORESAL) tablet 10 mg  10 mg Oral QID    bisacodyL (DULCOLAX) suppository 10 mg  10 mg Rectal DAILY    calcium carbonate (TUMS) chewable tablet 200 mg [elemental]  200 mg Oral TID    cyanocobalamin (VITAMIN B12) tablet 100 mcg  100 mcg Oral DAILY    levothyroxine (SYNTHROID) tablet 125 mcg  125 mcg Oral ACB    lidocaine (XYLOCAINE) 4 % cream   Topical DAILY    cetirizine (ZYRTEC) tablet 10 mg  10 mg Oral DAILY    magnesium oxide (MAG-OX) tablet 400 mg  400 mg Oral DAILY    melatonin tablet 3 mg  3 mg Oral QHS    ondansetron (ZOFRAN ODT) tablet 4 mg  4 mg Oral Q4H PRN    pantoprazole (PROTONIX) tablet 40 mg  40 mg Oral ACB&D    nitroglycerin (NITROBID) 2 % ointment 1 Inch  1 Inch Topical Q15MIN PRN    nystatin (MYCOSTATIN) 100,000 unit/gram ointment   Topical BID    senna-docusate (PERICOLACE) 8.6-50 mg per tablet 2 Tablet  2 Tablet Oral DAILY          Objective:     Patient Vitals for the past 8 hrs:   BP Temp Pulse Resp SpO2 Height Weight   08/13/21 1530 120/66 98.3 °F (36.8 °C) 66 16 92 %     08/13/21 1400   60       08/13/21 1200   60       08/13/21 1119 110/66 98 °F (36.7 °C) 62 16 93 %     08/13/21 1037      6' 1\" (1.854 m) 250 lb (113.4 kg)   08/13/21 1000   64           08/13 0701 - 08/13 1900  In: -   Out: 230 [Urine:230]  08/11 1901 - 08/13 0700  In: -   Out: 900 [Urine:900]    Lab Review   Recent Results (from the past 24 hour(s))   LIPID PANEL    Collection Time: 08/13/21  3:15 AM   Result Value Ref Range    Cholesterol, total 127 <200 MG/DL    Triglyceride 148 <150 MG/DL    HDL Cholesterol 50 MG/DL    LDL, calculated 47.4 0 - 100 MG/DL    VLDL, calculated 29.6 MG/DL    CHOL/HDL Ratio 2.5 0.0 - 5.0     SAMPLES BEING HELD    Collection Time: 08/13/21  3:15 AM   Result Value Ref Range    SAMPLES BEING HELD 1LAV     COMMENT        Add-on orders for these samples will be processed based on acceptable specimen integrity and analyte stability, which may vary by analyte. Physical exam is largely deferred. HEENT appears grossly unremarkable neck is supple patient remains in [place] collar. Cardiopulmonary exams are unremarkable. Extremities are warm/dry. Neurologically patient is oriented to himself states that he knows is in the hospital unable to state which 1 and able to ensure day month orientation questions. Attention is impaired. Speech is clear language is tangential.  Remainder examination is deferred.       Assessment:     Kari Lackey is a 80year old RH dominant male with history of quadriplegia following a high cervical cord injury who presented with confusion and hallucinations in setting of a Luther tract infection found to have bilateral posterior circulation ischemic infarcts    Plan:   Bilateral cortical ischemic infarct:   Suspect cardioembolism as likely etiology, echocardiogram demonstrated left atrial enlargement without further significant findings  MRA is unremarkable we will obtain ultrasound of carotid/extracranial vasculature  Patient has been started on aspirin in addition to statin, lipid panel sent  Should be continued on telemetry may need to consider prolonged cardiac monitoring  If confusion does not improve over the next few days (notably better yesterday with suspicion for delirium), may need to consider palliative care consultation given patient's largely debilitated status overall      Signed:  Cinthya Villarreal MD  8/13/2021  5:12 PM

## 2021-08-13 NOTE — PROGRESS NOTES
Carotid Duplex attempted but patient has a cervical collar on. It is not known whether it can be removed at this time or not. Will check this weekend if it can be removed. If not, a CTA or MRA of the neck may be suggested.     Archie Echevarria RVT  Vascular Lab

## 2021-08-13 NOTE — PROGRESS NOTES
Day # 1 of Meropenem  Indication:  UTI - has indwelling catheter in patient with neurogenic bladder- bed bound.   Current regimen:  Meropenem 500 mg Q12H    Recent Labs     21  0530 21  1112   WBC 10.7 10.6   CREA 0.56* 0.80   BUN 31* 31*     Est CrCl: 129.6 ml/min  Temp (24hrs), Av.9 °F (36.6 °C), Min:97.5 °F (36.4 °C), Max:98.3 °F (36.8 °C)    Cultures:   21: urine cx (+) for GNR    Plan: Change to Q6H as per renal adjustment protocol

## 2021-08-13 NOTE — PROGRESS NOTES
Staff able to remove cervical collar to complete vascular studies, bathing, cleaning, and temporarily to reposition it for comfort

## 2021-08-13 NOTE — PROGRESS NOTES
[address] [facility] Adult  Hospitalist Group                                                                                          Hospitalist Progress Note  Martinez Reeves MD  Answering service: [phone] OR [phone] from in house phone     NAME:  Clarice Griggs  :  1950  MRN:  [id number]      Admission Summary: This is a 71-year gentleman with past medical history of tetraplegia 2/2  spinal cord injury, C5 AIS B following GLF, after surgery for cervical stenosis on 4/15/21 at [facility]. Patient now has autonomic dysreflexia, neurogenic bowel and bladder and has been bedbound, [facility] He recently finished course of antibiotics for UTI.     Patient patient is now brought for evaluation of confusion, hallucinations. As per daughter, patient is oriented x3 at baseline, however since last 4 days he has been confused and for last 2 days he has been having visual and auditory hallucinations. Daughter also reported decreased appetite. Interval history / Subjective:   Patient without complaints- family at bedside  Brain MRI showing acute CVA- aspirin ordered  Review care plan with patient and his son     Assessment & Plan:      Altered mental status secondary to UTI and acute CVA- improved    UTI- follow urine cultures  Cont IV ceftriaxone  Patient is on scheduled straight catheterization every 6 hours     Quadriplegia due to previous high cervical spinal cord injury,   Autonomic dysreflexia, neurogenic bowel and bladder- OT/PT  Continue c-collar for now, has follow-up appointment with [facility] neurosurgery on     Acute CVA- neurology consulted and following  Added 81mg aspirin, cont statin  Check lipid panel and ECHO w/ bubble study    Hypothyroidism, HLD, GERD- stable on current meds      PT/OT    Full Code  : [place](Daughter) [phone]    Code status: FULL  DVT prophylaxis: lovenox    Care Plan discussed with: Patient/Family  Anticipated Disposition: SNF/LTC  Anticipated Discharge: Greater than 48 hours     Hospital Problems  Never Reviewed        Codes Class Noted POA    Altered mental status ICD-10-CM: R41.82  ICD-9-CM: 780.97  8/11/2021 Unknown                Review of Systems:   A comprehensive review of systems was negative except for that written in the HPI. Vital Signs:    Last 24hrs VS reviewed since prior progress note. Most recent are:  Visit Vitals  BP (!) 147/77   Pulse 66   Temp 97.9 °F (36.6 °C)   Resp 17   SpO2 93%         Intake/Output Summary (Last 24 hours) at 8/13/2021 0656  Last data filed at 8/13/2021 0600  Gross per 24 hour   Intake    Output 900 ml   Net -900 ml        Physical Examination:           Constitutional:  No acute distress, cooperative, pleasant    HEENT:  normocephalic, Oral mucosa moist, wearing cervical collar, . Resp:  CTA bilaterally. No wheezing/rhonchi/rales. No accessory muscle use   CV:  Regular rhythm, normal rate, no murmurs, gallops, rubs    GI:  Soft, non distended, non tender. normoactive bowel sounds, no hepatosplenomegaly     Musculoskeletal:  functional quadriplegia    Neurologic:  awake and alert, ? Slurring of speech, unable to move extremities but this is not new per patient            Data Review:    Review and/or order of clinical lab test  Review and/or order of tests in the radiology section of CPT  Review and/or order of tests in the medicine section of CPT    MRI Brain:  Multiple scattered acute cortical infarcts in the bilateral parietal lobes,  right larger than left. No evidence of acute hemorrhage.     The ventricles are normal in size and position. Scattered periventricular and  deep white matter T2/FLAIR hyperintensities, consistent with mild chronic  microvascular ischemic disease. There is no extra-axial fluid collection or mass  effect. There is no cerebellar tonsillar herniation.  Expected arterial  flow-voids are present.     The paranasal sinuses, mastoid air cells, and middle ears are clear. The orbital  contents are within normal limits with bilateral lens implants. No significant  osseous or scalp lesions are identified.     MRA Head:  There is no evidence of large vessel occlusion or flow-limiting stenosis of the  intracranial internal carotid, anterior cerebral, and middle cerebral arteries. The anterior communicating artery is patent.      There is no evidence of large vessel occlusion or flow-limiting stenosis of the  intracranial vertebral arteries, basilar artery, or posterior cerebral arteries. The posterior communicating arteries are patent, left larger than right.      There is no evidence of aneurysm or vascular malformation.     IMPRESSION  MRI Brain:  1. Multiple scattered acute cortical infarcts in the bilateral parietal lobes,  right larger than left. 2. Mild chronic microvascular ischemic disease.     MRA Head:  1. No evidence of significant stenosis or aneurysm. Labs:     Recent Labs     08/12/21  0530 08/11/21  1112   WBC 10.7 10.6   HGB 10.8* 11.9*   HCT 33.1* 38.0    228     Recent Labs     08/12/21  0530 08/11/21  1112    137   K 3.5 3.8    101   CO2 30 32   BUN 31* 31*   CREA 0.56* 0.80   GLU 73 93   CA 9.1 8.9   MG 1.7  --      Recent Labs     08/11/21  1112   ALT 12   AP 86   TBILI 0.6   TP 6.8   ALB 2.5*   GLOB 4.3*     No results for input(s): INR, PTP, APTT, INREXT in the last 72 hours. No results for input(s): FE, TIBC, PSAT, FERR in the last 72 hours. No results found for: FOL, RBCF   No results for input(s): PH, PCO2, PO2 in the last 72 hours.   Recent Labs     08/12/21  1137   CPK 71   CKNDX 2.1   TROIQ <0.05     Lab Results   Component Value Date/Time    Cholesterol, total 127 08/13/2021 03:15 AM    HDL Cholesterol 50 08/13/2021 03:15 AM    LDL, calculated 47.4 08/13/2021 03:15 AM    Triglyceride 148 08/13/2021 03:15 AM    CHOL/HDL Ratio 2.5 08/13/2021 03:15 AM     Lab Results   Component Value Date/Time    Glucose (POC) 83 08/12/2021 06:38 AM    Glucose (POC) 82 08/11/2021 09:32 PM     Lab Results   Component Value Date/Time    Color DARK YELLOW 08/11/2021 11:02 AM    Appearance CLOUDY (A) 08/11/2021 11:02 AM    Specific gravity 1.024 08/11/2021 11:02 AM    pH (UA) 5.0 08/11/2021 11:02 AM    Protein 100 (A) 08/11/2021 11:02 AM    Glucose Negative 08/11/2021 11:02 AM    Ketone Negative 08/11/2021 11:02 AM    Urobilinogen 0.2 08/11/2021 11:02 AM    Nitrites Positive (A) 08/11/2021 11:02 AM    Leukocyte Esterase MODERATE (A) 08/11/2021 11:02 AM    Epithelial cells FEW 08/11/2021 11:02 AM    Bacteria 1+ (A) 08/11/2021 11:02 AM    WBC >100 (H) 08/11/2021 11:02 AM    RBC 5-10 08/11/2021 11:02 AM         Medications Reviewed:     Current Facility-Administered Medications   Medication Dose Route Frequency    balsam peru-castor oiL (VENELEX) ointment   Topical Q8H    aspirin chewable tablet 81 mg  81 mg Oral DAILY    sodium chloride (NS) flush 5-40 mL  5-40 mL IntraVENous Q8H    sodium chloride (NS) flush 5-40 mL  5-40 mL IntraVENous PRN    acetaminophen (TYLENOL) tablet 650 mg  650 mg Oral Q6H PRN    Or    acetaminophen (TYLENOL) suppository 650 mg  650 mg Rectal Q6H PRN    polyethylene glycol (MIRALAX) packet 17 g  17 g Oral DAILY PRN    ondansetron (ZOFRAN) injection 4 mg  4 mg IntraVENous Q6H PRN    enoxaparin (LOVENOX) injection 40 mg  40 mg SubCUTAneous DAILY    lactated Ringers infusion  75 mL/hr IntraVENous CONTINUOUS    cefTRIAXone (ROCEPHIN) 1 g in 0.9% sodium chloride (MBP/ADV) 50 mL MBP  1 g IntraVENous Q24H    albuterol (PROVENTIL VENTOLIN) nebulizer solution 1.25 mg  1.25 mg Nebulization Q6H PRN    atorvastatin (LIPITOR) tablet 20 mg  20 mg Oral DAILY    baclofen (LIORESAL) tablet 10 mg  10 mg Oral QID    bisacodyL (DULCOLAX) suppository 10 mg  10 mg Rectal DAILY    calcium carbonate (TUMS) chewable tablet 200 mg [elemental]  200 mg Oral TID    cyanocobalamin (VITAMIN B12) tablet 100 mcg  100 mcg Oral DAILY    levothyroxine (SYNTHROID) tablet 125 mcg  125 mcg Oral ACB    lidocaine (XYLOCAINE) 4 % cream   Topical DAILY    cetirizine (ZYRTEC) tablet 10 mg  10 mg Oral DAILY    magnesium oxide (MAG-OX) tablet 400 mg  400 mg Oral DAILY    melatonin tablet 3 mg  3 mg Oral QHS    ondansetron (ZOFRAN ODT) tablet 4 mg  4 mg Oral Q4H PRN    pantoprazole (PROTONIX) tablet 40 mg  40 mg Oral ACB&D    nitroglycerin (NITROBID) 2 % ointment 1 Inch  1 Inch Topical Q15MIN PRN    nystatin (MYCOSTATIN) 100,000 unit/gram ointment   Topical BID    senna-docusate (PERICOLACE) 8.6-50 mg per tablet 2 Tablet  2 Tablet Oral DAILY     ______________________________________________________________________  EXPECTED LENGTH OF STAY: 3d 19h  ACTUAL LENGTH OF STAY:          2                 Marva Bassett MD

## 2021-08-13 NOTE — PROGRESS NOTES
SPEECH THERAPY SCREENING:  SERVICES ARE INDICATED AND THERAPY ORDER IS REQUIRED    An InBasket screening referral was triggered for speech therapy based on results obtained during the nursing admission assessment. The patients chart was reviewed and the patient is appropriate for a skilled therapy evaluation. Please order a consult for speech therapy if you are in agreement and would like an evaluation to be completed. Thank you. Note MRI showed acute CVA.     Jese Morales, SLP

## 2021-08-14 PROBLEM — I63.433 CEREBROVASCULAR ACCIDENT (CVA) DUE TO BILATERAL EMBOLISM OF POSTERIOR CEREBRAL ARTERIES (HCC): Status: ACTIVE | Noted: 2021-08-14

## 2021-08-14 PROBLEM — R41.0 DELIRIUM: Status: ACTIVE | Noted: 2021-08-14

## 2021-08-14 LAB
ATRIAL RATE: 68 BPM
BACTERIA SPEC CULT: ABNORMAL
BACTERIA SPEC CULT: ABNORMAL
CALCULATED P AXIS, ECG09: 64 DEGREES
CALCULATED R AXIS, ECG10: -15 DEGREES
CALCULATED T AXIS, ECG11: 64 DEGREES
CC UR VC: ABNORMAL
DIAGNOSIS, 93000: NORMAL
GLUCOSE BLD STRIP.AUTO-MCNC: 95 MG/DL (ref 65–117)
LEFT CCA DIST DIAS: 7 CM/S
LEFT CCA DIST SYS: 70.5 CM/S
LEFT CCA PROX DIAS: 9.7 CM/S
LEFT CCA PROX SYS: 102.3 CM/S
LEFT ECA DIAS: 0 CM/S
LEFT ECA SYS: 119.5 CM/S
LEFT ICA DIST DIAS: 18.9 CM/S
LEFT ICA DIST SYS: 70.5 CM/S
LEFT ICA MID DIAS: 20.2 CM/S
LEFT ICA MID SYS: 90.4 CM/S
LEFT ICA PROX DIAS: 20.2 CM/S
LEFT ICA PROX SYS: 87.7 CM/S
LEFT ICA/CCA SYS: 1.28
LEFT VERTEBRAL DIAS: 10.89 CM/S
LEFT VERTEBRAL SYS: 47.9 CM/S
P-R INTERVAL, ECG05: 190 MS
Q-T INTERVAL, ECG07: 450 MS
QRS DURATION, ECG06: 106 MS
QTC CALCULATION (BEZET), ECG08: 478 MS
RIGHT CCA DIST DIAS: 6.9 CM/S
RIGHT CCA DIST SYS: 74 CM/S
RIGHT CCA PROX DIAS: 4.8 CM/S
RIGHT CCA PROX SYS: 92.1 CM/S
RIGHT ECA DIAS: 0 CM/S
RIGHT ECA SYS: 118.1 CM/S
RIGHT ICA DIST DIAS: 14.6 CM/S
RIGHT ICA DIST SYS: 72.9 CM/S
RIGHT ICA MID DIAS: 9.1 CM/S
RIGHT ICA MID SYS: 64.1 CM/S
RIGHT ICA PROX DIAS: 9.1 CM/S
RIGHT ICA PROX SYS: 58.6 CM/S
RIGHT ICA/CCA SYS: 1
RIGHT VERTEBRAL DIAS: 8.11 CM/S
RIGHT VERTEBRAL SYS: 58.7 CM/S
SERVICE CMNT-IMP: ABNORMAL
SERVICE CMNT-IMP: NORMAL
VENTRICULAR RATE, ECG03: 68 BPM

## 2021-08-14 PROCEDURE — 74011250636 HC RX REV CODE- 250/636: Performed by: FAMILY MEDICINE

## 2021-08-14 PROCEDURE — 74011250637 HC RX REV CODE- 250/637

## 2021-08-14 PROCEDURE — 74011250636 HC RX REV CODE- 250/636: Performed by: INTERNAL MEDICINE

## 2021-08-14 PROCEDURE — 74011250637 HC RX REV CODE- 250/637: Performed by: INTERNAL MEDICINE

## 2021-08-14 PROCEDURE — 51798 US URINE CAPACITY MEASURE: CPT

## 2021-08-14 PROCEDURE — 74011000258 HC RX REV CODE- 258: Performed by: FAMILY MEDICINE

## 2021-08-14 PROCEDURE — 74011250637 HC RX REV CODE- 250/637: Performed by: FAMILY MEDICINE

## 2021-08-14 PROCEDURE — 82962 GLUCOSE BLOOD TEST: CPT

## 2021-08-14 PROCEDURE — 99232 SBSQ HOSP IP/OBS MODERATE 35: CPT | Performed by: PSYCHIATRY & NEUROLOGY

## 2021-08-14 PROCEDURE — 93005 ELECTROCARDIOGRAM TRACING: CPT

## 2021-08-14 PROCEDURE — 65660000000 HC RM CCU STEPDOWN

## 2021-08-14 PROCEDURE — 77010033678 HC OXYGEN DAILY

## 2021-08-14 RX ORDER — NITROGLYCERIN 0.4 MG/1
TABLET SUBLINGUAL
Status: COMPLETED
Start: 2021-08-14 | End: 2021-08-14

## 2021-08-14 RX ORDER — NITROGLYCERIN 0.4 MG/1
0.4 TABLET SUBLINGUAL AS NEEDED
Status: DISCONTINUED | OUTPATIENT
Start: 2021-08-14 | End: 2021-08-18 | Stop reason: HOSPADM

## 2021-08-14 RX ADMIN — PANTOPRAZOLE SODIUM 40 MG: 40 TABLET, DELAYED RELEASE ORAL at 07:04

## 2021-08-14 RX ADMIN — LEVOTHYROXINE SODIUM 125 MCG: 0.12 TABLET ORAL at 07:04

## 2021-08-14 RX ADMIN — ASPIRIN 81 MG: 81 TABLET, CHEWABLE ORAL at 10:23

## 2021-08-14 RX ADMIN — BACLOFEN 10 MG: 10 TABLET ORAL at 17:26

## 2021-08-14 RX ADMIN — CALCIUM CARBONATE (ANTACID) CHEW TAB 500 MG 200 MG: 500 CHEW TAB at 10:23

## 2021-08-14 RX ADMIN — Medication 10 ML: at 14:00

## 2021-08-14 RX ADMIN — Medication 400 MG: at 10:22

## 2021-08-14 RX ADMIN — LIDOCAINE 4%: 4 CREAM TOPICAL at 09:00

## 2021-08-14 RX ADMIN — Medication 100 MCG: at 10:23

## 2021-08-14 RX ADMIN — ATORVASTATIN CALCIUM 20 MG: 20 TABLET, FILM COATED ORAL at 10:23

## 2021-08-14 RX ADMIN — Medication: at 21:28

## 2021-08-14 RX ADMIN — Medication 3 MG: at 21:27

## 2021-08-14 RX ADMIN — BACLOFEN 10 MG: 10 TABLET ORAL at 21:27

## 2021-08-14 RX ADMIN — NYSTATIN OINTMENT: 100000 OINTMENT TOPICAL at 10:23

## 2021-08-14 RX ADMIN — ENOXAPARIN SODIUM 40 MG: 40 INJECTION SUBCUTANEOUS at 10:23

## 2021-08-14 RX ADMIN — BISACODYL 10 MG: 10 SUPPOSITORY RECTAL at 10:23

## 2021-08-14 RX ADMIN — Medication: at 10:23

## 2021-08-14 RX ADMIN — ACETAMINOPHEN 650 MG: 325 TABLET ORAL at 07:04

## 2021-08-14 RX ADMIN — MEROPENEM 500 MG: 500 INJECTION, POWDER, FOR SOLUTION INTRAVENOUS at 21:27

## 2021-08-14 RX ADMIN — Medication: at 14:00

## 2021-08-14 RX ADMIN — CALCIUM CARBONATE (ANTACID) CHEW TAB 500 MG 200 MG: 500 CHEW TAB at 21:27

## 2021-08-14 RX ADMIN — MEROPENEM 500 MG: 500 INJECTION, POWDER, FOR SOLUTION INTRAVENOUS at 07:04

## 2021-08-14 RX ADMIN — NITROGLYCERIN: 0.4 TABLET, ORALLY DISINTEGRATING SUBLINGUAL at 00:15

## 2021-08-14 RX ADMIN — NITROGLYCERIN 1 INCH: 20 OINTMENT TOPICAL at 03:48

## 2021-08-14 RX ADMIN — BACLOFEN 10 MG: 10 TABLET ORAL at 14:11

## 2021-08-14 RX ADMIN — CETIRIZINE HYDROCHLORIDE 10 MG: 10 TABLET, FILM COATED ORAL at 10:23

## 2021-08-14 RX ADMIN — MEROPENEM 500 MG: 500 INJECTION, POWDER, FOR SOLUTION INTRAVENOUS at 14:12

## 2021-08-14 RX ADMIN — PANTOPRAZOLE SODIUM 40 MG: 40 TABLET, DELAYED RELEASE ORAL at 17:26

## 2021-08-14 RX ADMIN — BACLOFEN 10 MG: 10 TABLET ORAL at 10:23

## 2021-08-14 RX ADMIN — DOCUSATE SODIUM 50 MG AND SENNOSIDES 8.6 MG 2 TABLET: 8.6; 5 TABLET, FILM COATED ORAL at 10:22

## 2021-08-14 NOTE — ROUTINE PROCESS
2243-Pt A/Ox1, to self. Pt repeatedly stating he believes he is in the hallway or out in the rain, RN reorienting and reassuring pt who appears to be in emotional distress. RN reassuring pt that he is in the bed ordered for him in his room, pt stated he thinks he is in a chair. RN called pt daughter, Gena Olson, back after she called asking for updates. Pt family stated he has been increasingly confused, RN attempted to comfort family who also appeared to be struggling with her father's current mental status. Pt BP increasing, RN bladder scanned at 2127 for 290ml, dayshift had straight cathed pt at 1830 for 400ml of urine. Pt had a hard/watery BM at 2234, BP increased following turn to 170/84, pt diaphoretic, stating he had a headache. RN recheck BP a few minutes after turn, noted the BP decreased to 137/69. Pt appears more comfortable at this time, turned by staff following incontinence care. 0007-RRT called for chest pain, lightheadedness, diaphoresis, elevated BP. Bladder scan showed 295, BG 95, /87, RR in the 20s. Pt still A/ox1, asking to be taken to the hospital as he \"doesn't feel right\". NP familiar with pt, ordered sublingual nitro during rapid, BP dropped soon after to 104/54. STAT EKG obtained by RT. Following the end of the rapid, pt brief checked and noted to be saturated. Incontinence care performed, post void bladder scan showed 287ml.    0321-VS check showed BP elevated again to 159/75. Nitroglycerin paste already available for SBP>150, RN applied to R chest. Pt placed on 2 L NC of oxygen as his O2 saturations were 89-91% on RA.    0409-RN made NP aware of this change, as well that pt was placed on oxygen. NP stated \"noted\" via [employer]. Pt refusing PO meds or pain medication. 0540-/70. Pt continues to be downtrodden, speaking as if he will not be living much longer. NP aware.

## 2021-08-14 NOTE — PROGRESS NOTES
Rapid Response Documentation    Name: Alvina Abernathy  YOB: 1950  MRN: [id number]  Admission Date: 8/11/2021 10:42 AM      Date of service: 8/14/2021    Active Diagnoses:    Hospital Problems  Never Reviewed        Codes Class Noted POA    Altered mental status ICD-10-CM: R41.82  ICD-9-CM: 780.97  8/11/2021 Unknown              Chief Complaint:    [facility] of new onset chest pain and hypertension (173/87). Patient is a bit confused and HPI is questionable. Patient states he developed pain in the middle of his chest while \"waiting around the hospital\". He was able to deny dizziness, palpitations and nausea. Clinical Presentation:    80year-old male originally admitted 8/11/2021 for evaluation of confusion and hallucinations likely related to UTI/sepsis, infectious encephalopathy. Recent code stroke called for newly noticed left facial droop. MRI demonstrates multifocal posterior circulation bilateral ischemic infarcts. Significant PMH includes tetraplegia, spinal cord injury and autonomic dysreflexia. Physical Exam:   · Alert, pleasantly confused  · Pupils unequal, right greater than left. This is baseline  · Chest clear, no rales, rhonchi or wheezing  · Abdomen soft, nontender nondistended  · Trace to 1+ edema lower extremities. Paralysis    EKG: Normal sinus rhythm, rate 68. No acute changes or ectopy.     Patient Vitals for the past 12 hrs:   Temp Pulse Resp BP SpO2   08/14/21 0208  74      08/14/21 0034  77  (!) 109/54    08/14/21 0022  64   94 %   08/14/21 0022    (!) 104/54    08/14/21 0018  70   95 %   08/14/21 0009 98.5 °F (36.9 °C) 77 24 (!) 173/87 95 %   08/14/21 0009 98.5 °F (36.9 °C) 72 24 (!) 173/87 96 %   08/14/21 0005  74  (!) 173/87 95 %   08/13/21 2238  73  137/69    08/13/21 2234  73 24 (!) 170/84 96 %   08/13/21 2148  69      08/13/21 2125 98.5 °F (36.9 °C) 70 16 (!) 160/81 95 %   08/13/21 2000  [facility] 10   08/13/21 1530 98.3 °F (36.8 °C) 66 16 120/66 92 %         Data Reviewed: All diagnostic labs and studies have been reviewed.     Assessment and Plan:    Patient complaint of chest pain  Hypertension  · EKG with no acute changes, no changes from previous EKGs  · Current  significantly higher than baseline (100- 120)  · In setting of chest pain, HTN and history of autonomic dysreflexia, will give sublingual nitro  · Continue cardiac monitoring please  · Will consider labs/additional work-up if chest pain/HTN recurs    d/w primary RN                Madeline Atkins, MSN, RN, NP-C  [phone] or via [employer]    8/14/2021

## 2021-08-14 NOTE — PROGRESS NOTES
[facility] [facility] Adult  Hospitalist Group                                                                                          Hospitalist Progress Note  Lisa Williamson MD  Answering service: [phone] OR [phone] from in house phone     NAME:  Cooper Fong  :  1950  MRN:  [id number]      Admission Summary: This is a 71-year gentleman with past medical history of tetraplegia 2/2  spinal cord injury, C5 AIS B following GLF, after surgery for cervical stenosis on 4/15/21 at [facility]. Patient now has autonomic dysreflexia, neurogenic bowel and bladder and has been bedbound, [facility] He recently finished course of antibiotics for UTI.     Patient patient is now brought for evaluation of confusion, hallucinations. As per daughter, patient is oriented x3 at baseline, however since last 4 days he has been confused and for last 2 days he has been having visual and auditory hallucinations. Daughter also reported decreased appetite. Interval history / Subjective:   Patient without complaints- family at bedside  Brain MRI showing acute CVA- aspirin ordered  Review care plan with patient and his son     Assessment & Plan:      Altered mental status secondary to UTI and acute CVA- improved    UTI- follow urine cultures  stop ceftriaxone and start IV meropenem  Patient is on scheduled straight catheterization every 6 hours     Quadriplegia due to previous high cervical spinal cord injury,   Autonomic dysreflexia, neurogenic bowel and bladder- OT/PT  Continue c-collar for now, has follow-up appointment with [facility] neurosurgery on     Acute CVA- neurology consulted and following  cont 81mg aspirin, cont statin- LDL at goal   ECHO w/ bubble study- was negative for PFO  Cont tele monitoring, carotid dopplers were ordered by neurology    Hypothyroidism, HLD, GERD- stable on current meds      PT/OT    Full Code  : [place](Daughter) [phone]    Code status: FULL  DVT prophylaxis: lovenox    Care Plan discussed with: Patient/Family  Anticipated Disposition: SNF/LTC  Anticipated Discharge: Greater than 48 hours     Hospital Problems  Never Reviewed        Codes Class Noted POA    Altered mental status ICD-10-CM: R41.82  ICD-9-CM: 780.97  8/11/2021 Unknown                Review of Systems:   A comprehensive review of systems was negative except for that written in the HPI. Vital Signs:    Last 24hrs VS reviewed since prior progress note. Most recent are:  Visit Vitals  /70 (BP 1 Location: Right upper arm, BP Patient Position: Lying; At rest)   Pulse 74   Temp 98.2 °F (36.8 °C)   Resp 24   Ht 6' 1\" (1.854 m)   Wt 113.4 kg (250 lb)   SpO2 96%   BMI 32.98 kg/m²         Intake/Output Summary (Last 24 hours) at 8/14/2021 0658  Last data filed at 8/13/2021 1833  Gross per 24 hour   Intake    Output 630 ml   Net -630 ml        Physical Examination:           Constitutional:  No acute distress, cooperative, pleasant    HEENT:  normocephalic, Oral mucosa moist, wearing cervical collar, . Resp:  CTA bilaterally. No wheezing/rhonchi/rales. No accessory muscle use   CV:  Regular rhythm, normal rate, no murmurs, gallops, rubs    GI:  Soft, non distended, non tender. normoactive bowel sounds, no hepatosplenomegaly     Musculoskeletal:  functional quadriplegia    Neurologic:  awake and alert, ? Slurring of speech, unable to move extremities but this is not new per patient            Data Review:    Review and/or order of clinical lab test  Review and/or order of tests in the radiology section of CPT  Review and/or order of tests in the medicine section of CPT    MRI Brain:  Multiple scattered acute cortical infarcts in the bilateral parietal lobes,  right larger than left. No evidence of acute hemorrhage.     The ventricles are normal in size and position.  Scattered periventricular and  deep white matter T2/FLAIR hyperintensities, consistent with mild chronic  microvascular ischemic disease. There is no extra-axial fluid collection or mass  effect. There is no cerebellar tonsillar herniation. Expected arterial  flow-voids are present.     The paranasal sinuses, mastoid air cells, and middle ears are clear. The orbital  contents are within normal limits with bilateral lens implants. No significant  osseous or scalp lesions are identified.     MRA Head:  There is no evidence of large vessel occlusion or flow-limiting stenosis of the  intracranial internal carotid, anterior cerebral, and middle cerebral arteries. The anterior communicating artery is patent.      There is no evidence of large vessel occlusion or flow-limiting stenosis of the  intracranial vertebral arteries, basilar artery, or posterior cerebral arteries. The posterior communicating arteries are patent, left larger than right.      There is no evidence of aneurysm or vascular malformation.     IMPRESSION  MRI Brain:  1. Multiple scattered acute cortical infarcts in the bilateral parietal lobes,  right larger than left. 2. Mild chronic microvascular ischemic disease.     MRA Head:  1. No evidence of significant stenosis or aneurysm. Labs:     Recent Labs     08/12/21  0530 08/11/21  1112   WBC 10.7 10.6   HGB 10.8* 11.9*   HCT 33.1* 38.0    228     Recent Labs     08/12/21  0530 08/11/21  1112    137   K 3.5 3.8    101   CO2 30 32   BUN 31* 31*   CREA 0.56* 0.80   GLU 73 93   CA 9.1 8.9   MG 1.7  --      Recent Labs     08/11/21  1112   ALT 12   AP 86   TBILI 0.6   TP 6.8   ALB 2.5*   GLOB 4.3*     No results for input(s): INR, PTP, APTT, INREXT, INREXT in the last 72 hours. No results for input(s): FE, TIBC, PSAT, FERR in the last 72 hours. No results found for: FOL, RBCF   No results for input(s): PH, PCO2, PO2 in the last 72 hours.   Recent Labs     08/12/21  1137   CPK 71   CKNDX 2.1   TROIQ <0.05     Lab Results   Component Value Date/Time    Cholesterol, total 127 08/13/2021 03:15 AM    HDL Cholesterol 50 08/13/2021 03:15 AM    LDL, calculated 47.4 08/13/2021 03:15 AM    Triglyceride 148 08/13/2021 03:15 AM    CHOL/HDL Ratio 2.5 08/13/2021 03:15 AM     Lab Results   Component Value Date/Time    Glucose (POC) 95 08/14/2021 12:10 AM    Glucose (POC) 83 08/12/2021 06:38 AM    Glucose (POC) 82 08/11/2021 09:32 PM     Lab Results   Component Value Date/Time    Color DARK YELLOW 08/11/2021 11:02 AM    Appearance CLOUDY (A) 08/11/2021 11:02 AM    Specific gravity 1.024 08/11/2021 11:02 AM    pH (UA) 5.0 08/11/2021 11:02 AM    Protein 100 (A) 08/11/2021 11:02 AM    Glucose Negative 08/11/2021 11:02 AM    Ketone Negative 08/11/2021 11:02 AM    Urobilinogen 0.2 08/11/2021 11:02 AM    Nitrites Positive (A) 08/11/2021 11:02 AM    Leukocyte Esterase MODERATE (A) 08/11/2021 11:02 AM    Epithelial cells FEW 08/11/2021 11:02 AM    Bacteria 1+ (A) 08/11/2021 11:02 AM    WBC >100 (H) 08/11/2021 11:02 AM    RBC 5-10 08/11/2021 11:02 AM         Medications Reviewed:     Current Facility-Administered Medications   Medication Dose Route Frequency    nitroglycerin (NITROSTAT) tablet 0.4 mg  0.4 mg SubLINGual PRN    meropenem (MERREM) 500 mg in 0.9% sodium chloride (MBP/ADV) 50 mL MBP  0.5 g IntraVENous Q12H    balsam peru-castor oiL (VENELEX) ointment   Topical Q8H    aspirin chewable tablet 81 mg  81 mg Oral DAILY    sodium chloride (NS) flush 5-40 mL  5-40 mL IntraVENous Q8H    sodium chloride (NS) flush 5-40 mL  5-40 mL IntraVENous PRN    acetaminophen (TYLENOL) tablet 650 mg  650 mg Oral Q6H PRN    Or    acetaminophen (TYLENOL) suppository 650 mg  650 mg Rectal Q6H PRN    polyethylene glycol (MIRALAX) packet 17 g  17 g Oral DAILY PRN    ondansetron (ZOFRAN) injection 4 mg  4 mg IntraVENous Q6H PRN    enoxaparin (LOVENOX) injection 40 mg  40 mg SubCUTAneous DAILY    albuterol (PROVENTIL VENTOLIN) nebulizer solution 1.25 mg  1.25 mg Nebulization Q6H PRN    atorvastatin (LIPITOR) tablet 20 mg  20 mg Oral DAILY    baclofen (LIORESAL) tablet 10 mg  10 mg Oral QID    bisacodyL (DULCOLAX) suppository 10 mg  10 mg Rectal DAILY    calcium carbonate (TUMS) chewable tablet 200 mg [elemental]  200 mg Oral TID    cyanocobalamin (VITAMIN B12) tablet 100 mcg  100 mcg Oral DAILY    levothyroxine (SYNTHROID) tablet 125 mcg  125 mcg Oral ACB    lidocaine (XYLOCAINE) 4 % cream   Topical DAILY    cetirizine (ZYRTEC) tablet 10 mg  10 mg Oral DAILY    magnesium oxide (MAG-OX) tablet 400 mg  400 mg Oral DAILY    melatonin tablet 3 mg  3 mg Oral QHS    ondansetron (ZOFRAN ODT) tablet 4 mg  4 mg Oral Q4H PRN    pantoprazole (PROTONIX) tablet 40 mg  40 mg Oral ACB&D    nitroglycerin (NITROBID) 2 % ointment 1 Inch  1 Inch Topical Q15MIN PRN    nystatin (MYCOSTATIN) 100,000 unit/gram ointment   Topical BID    senna-docusate (PERICOLACE) 8.6-50 mg per tablet 2 Tablet  2 Tablet Oral DAILY     ______________________________________________________________________  EXPECTED LENGTH OF STAY: 3d 19h  ACTUAL LENGTH OF STAY:          3                 Alexander Clements MD

## 2021-08-14 NOTE — PROGRESS NOTES
Day #2 of meropenem  Indication:  UTI  Current regimen:  meropenem 500 mg q12h    Recent Labs     21  0530 21  1112   WBC 10.7 10.6   CREA 0.56* 0.80   BUN 31* 31*     Temp (24hrs), Av.2 °F (36.8 °C), Min:97.5 °F (36.4 °C), Max:98.5 °F (36.9 °C)    Est CrCl: >120 ml/min     Plan: Change to meropenem 500 mg q6h based on renal function.

## 2021-08-14 NOTE — PROGRESS NOTES
0007 Patient complaining of chest pain and tightness, lightheadedness, patient is diaphoretic.  Rapid response called    Patient bladder scanned during this time, bladder scan is 295    0009, BP is 173/87    0011 Genoveva NP at bedside, orders received for STAT EKG    0015 Nitro tablets ordered, 1 tablet administered    0019 Respiratory at bedside, EKG in progress    0022 BP following Nitro is 104/54    0026 Patient incontinent of urine

## 2021-08-14 NOTE — PROGRESS NOTES
SUBJECTIVE  Sondra Smith is a 80 y.o. male  with history of quadriplegia following a high cervical cord injury who presented with confusion and hallucinations in setting of a [place] tract infection found to have bilateral posterior circulation ischemic infarcts  Clinically he remains confused with intermittent hallucinations    EXAM  Exam:  Visit Vitals  BP (!) 115/57   Pulse 75   Temp 97.9 °F (36.6 °C)   Resp 16   Ht 6' 1\" (1.854 m)   Wt 250 lb (113.4 kg)   SpO2 94%   BMI 32.98 kg/m²     Gen:  CV: RRR  Lungs: non labored breathing  Abd: non distending  Neuro: Awake and alert. Thinks that he is inform well. Asking me to take the cartons from his hands. Imagining other people present in the room  CN II-XII: PERRL, EOMI, face symmetric, tongue/palate midline  Motor: Quadriplegia  Sensory: Difficult to assess   Gait: Deferred    LABS/ IMAGING  MRI Results (most recent):  Results from Hospital Encounter encounter on 08/11/21    MRA BRAIN WO CONT    Narrative  EXAM:  MRI BRAIN WO CONT, MRA BRAIN WO CONT    INDICATION:    new left facial droop    COMPARISON:  CT head 8/11/2021. CONTRAST: None. TECHNIQUE:  MRI Brain:  Multiplanar multisequence acquisition without contrast of the brain. MRA Head:  Time-of-flight non-contrast MRA of the head was performed. Multiplanar and MIP  reconstructions were obtained. FINDINGS:  MRI Brain:  Multiple scattered acute cortical infarcts in the bilateral parietal lobes,  right larger than left. No evidence of acute hemorrhage. The ventricles are normal in size and position. Scattered periventricular and  deep white matter T2/FLAIR hyperintensities, consistent with mild chronic  microvascular ischemic disease. There is no extra-axial fluid collection or mass  effect. There is no cerebellar tonsillar herniation. Expected arterial  flow-voids are present. The paranasal sinuses, mastoid air cells, and middle ears are clear.  The orbital  contents are within normal limits with bilateral lens implants. No significant  osseous or scalp lesions are identified. MRA Head:  There is no evidence of large vessel occlusion or flow-limiting stenosis of the  intracranial internal carotid, anterior cerebral, and middle cerebral arteries. The anterior communicating artery is patent. There is no evidence of large vessel occlusion or flow-limiting stenosis of the  intracranial vertebral arteries, basilar artery, or posterior cerebral arteries. The posterior communicating arteries are patent, left larger than right. There is no evidence of aneurysm or vascular malformation. Impression  MRI Brain:  1. Multiple scattered acute cortical infarcts in the bilateral parietal lobes,  right larger than left. 2. Mild chronic microvascular ischemic disease. MRA Head:  1. No evidence of significant stenosis or aneurysm. MRI imaging reviewed    Carotid Doppler does not show any significant stenosis    No results found for: HBA1C, [id number], [id number]    Lab Results   Component Value Date/Time    Cholesterol, total 127 08/13/2021 03:15 AM    HDL Cholesterol 50 08/13/2021 03:15 AM    LDL, calculated 47.4 08/13/2021 03:15 AM    VLDL, calculated 29.6 08/13/2021 03:15 AM    Triglyceride 148 08/13/2021 03:15 AM    CHOL/HDL Ratio 2.5 08/13/2021 03:15 AM       ASSESSMENT  Hospital Problems  Never Reviewed        Codes Class Noted POA    Delirium ICD-10-CM: R41.0  ICD-9-CM: 780.09  8/14/2021 Unknown               PLAN  The infarct in the bilateral posterior circulation appeared to be from an embolic source likely cardioembolism  Remainder of the work-up is negative  Continue aspirin and statin  Recommend considering extended cardiac monitoring or ILR to rule out paroxysmal atrial fibrillation. However due to, underlying comorbidities he may not be a candidate  Clinically he appears to be delirious with hallucinations.   Continue supportive care and treatment of underlying infection    Saul CAMACHO Len Kaur MD

## 2021-08-15 LAB
ANION GAP SERPL CALC-SCNC: 3 MMOL/L (ref 5–15)
BUN SERPL-MCNC: 18 MG/DL (ref 6–20)
BUN/CREAT SERPL: 40 (ref 12–20)
CALCIUM SERPL-MCNC: 8.7 MG/DL (ref 8.5–10.1)
CHLORIDE SERPL-SCNC: 105 MMOL/L (ref 97–108)
CO2 SERPL-SCNC: 31 MMOL/L (ref 21–32)
COMMENT, HOLDF: NORMAL
CREAT SERPL-MCNC: 0.45 MG/DL (ref 0.7–1.3)
GLUCOSE BLD STRIP.AUTO-MCNC: 103 MG/DL (ref 65–117)
GLUCOSE SERPL-MCNC: 101 MG/DL (ref 65–100)
POTASSIUM SERPL-SCNC: 3.4 MMOL/L (ref 3.5–5.1)
SAMPLES BEING HELD,HOLD: NORMAL
SERVICE CMNT-IMP: NORMAL
SODIUM SERPL-SCNC: 139 MMOL/L (ref 136–145)

## 2021-08-15 PROCEDURE — 74011250636 HC RX REV CODE- 250/636: Performed by: INTERNAL MEDICINE

## 2021-08-15 PROCEDURE — 36415 COLL VENOUS BLD VENIPUNCTURE: CPT

## 2021-08-15 PROCEDURE — 74011250637 HC RX REV CODE- 250/637: Performed by: INTERNAL MEDICINE

## 2021-08-15 PROCEDURE — 74011000258 HC RX REV CODE- 258: Performed by: FAMILY MEDICINE

## 2021-08-15 PROCEDURE — 80048 BASIC METABOLIC PNL TOTAL CA: CPT

## 2021-08-15 PROCEDURE — 82962 GLUCOSE BLOOD TEST: CPT

## 2021-08-15 PROCEDURE — 74011250636 HC RX REV CODE- 250/636: Performed by: FAMILY MEDICINE

## 2021-08-15 PROCEDURE — 65660000000 HC RM CCU STEPDOWN

## 2021-08-15 PROCEDURE — 51798 US URINE CAPACITY MEASURE: CPT

## 2021-08-15 PROCEDURE — 74011250637 HC RX REV CODE- 250/637: Performed by: FAMILY MEDICINE

## 2021-08-15 RX ADMIN — BACLOFEN 10 MG: 10 TABLET ORAL at 23:15

## 2021-08-15 RX ADMIN — Medication: at 14:00

## 2021-08-15 RX ADMIN — ASPIRIN 81 MG: 81 TABLET, CHEWABLE ORAL at 09:22

## 2021-08-15 RX ADMIN — NYSTATIN OINTMENT: 100000 OINTMENT TOPICAL at 09:00

## 2021-08-15 RX ADMIN — Medication 10 ML: at 23:16

## 2021-08-15 RX ADMIN — ENOXAPARIN SODIUM 40 MG: 40 INJECTION SUBCUTANEOUS at 09:49

## 2021-08-15 RX ADMIN — CALCIUM CARBONATE (ANTACID) CHEW TAB 500 MG 200 MG: 500 CHEW TAB at 23:13

## 2021-08-15 RX ADMIN — PANTOPRAZOLE SODIUM 40 MG: 40 TABLET, DELAYED RELEASE ORAL at 17:20

## 2021-08-15 RX ADMIN — Medication 3 MG: at 23:13

## 2021-08-15 RX ADMIN — MEROPENEM 500 MG: 500 INJECTION, POWDER, FOR SOLUTION INTRAVENOUS at 23:16

## 2021-08-15 RX ADMIN — MEROPENEM 500 MG: 500 INJECTION, POWDER, FOR SOLUTION INTRAVENOUS at 09:23

## 2021-08-15 RX ADMIN — BISACODYL 10 MG: 10 SUPPOSITORY RECTAL at 10:10

## 2021-08-15 RX ADMIN — Medication 100 MCG: at 09:23

## 2021-08-15 RX ADMIN — Medication 10 ML: at 14:00

## 2021-08-15 RX ADMIN — BACLOFEN 10 MG: 10 TABLET ORAL at 13:00

## 2021-08-15 RX ADMIN — CALCIUM CARBONATE (ANTACID) CHEW TAB 500 MG 200 MG: 500 CHEW TAB at 09:00

## 2021-08-15 RX ADMIN — ATORVASTATIN CALCIUM 20 MG: 20 TABLET, FILM COATED ORAL at 09:23

## 2021-08-15 RX ADMIN — NITROGLYCERIN 1 INCH: 20 OINTMENT TOPICAL at 02:01

## 2021-08-15 RX ADMIN — MEROPENEM 500 MG: 500 INJECTION, POWDER, FOR SOLUTION INTRAVENOUS at 17:22

## 2021-08-15 RX ADMIN — LIDOCAINE 4%: 4 CREAM TOPICAL at 09:00

## 2021-08-15 RX ADMIN — Medication 1 MG: at 09:30

## 2021-08-15 RX ADMIN — Medication: at 23:14

## 2021-08-15 RX ADMIN — BACLOFEN 10 MG: 10 TABLET ORAL at 09:22

## 2021-08-15 RX ADMIN — BACLOFEN 10 MG: 10 TABLET ORAL at 17:20

## 2021-08-15 RX ADMIN — Medication 400 MG: at 09:23

## 2021-08-15 RX ADMIN — MEROPENEM 500 MG: 500 INJECTION, POWDER, FOR SOLUTION INTRAVENOUS at 04:22

## 2021-08-15 RX ADMIN — DOCUSATE SODIUM 50 MG AND SENNOSIDES 8.6 MG 2 TABLET: 8.6; 5 TABLET, FILM COATED ORAL at 09:22

## 2021-08-15 RX ADMIN — CETIRIZINE HYDROCHLORIDE 10 MG: 10 TABLET, FILM COATED ORAL at 09:23

## 2021-08-15 NOTE — PROGRESS NOTES
[address] [facility]  Hospitalist Group                                                                                          Hospitalist Progress Note  Leonard Davis MD  Answering service: [phone] OR [phone] from in house phone     NAME:  Kg Boyd  :  1950  MRN:  [id number]      Admission Summary: This is a 71-year gentleman with past medical history of tetraplegia 2/2  spinal cord injury, C5 AIS B following GLF, after surgery for cervical stenosis on 4/15/21 at [facility]. 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 He recently finished course of antibiotics for UTI.     Patient patient is now brought for evaluation of confusion, hallucinations. As per daughter, patient is oriented x3 at baseline, however since last 4 days he has been confused and for last 2 days he has been having visual and auditory hallucinations. Daughter also reported decreased appetite. Interval history / Subjective:   Patient confused with some hallucinations  Brain MRI showing acute CVA-   Changed antibiotics     Assessment & Plan:      Altered mental status secondary to UTI and acute CVA-   Changed abx yesterday    UTI- cultures showing resistance to ceftriaxone- changed to IV meropenem  Patient is on scheduled straight catheterization every 6 hours     Quadriplegia due to previous high cervical spinal cord injury,   Autonomic dysreflexia, neurogenic bowel and bladder- OT/PT  Continue c-collar for now, has follow-up appointment with [facility] neurosurgery on     Acute CVA- neurology consulted and following  cont 81mg aspirin, cont statin- LDL at goal   ECHO w/ bubble study- was negative for PFO  Cont tele monitoring, carotid dopplers neg for critical stenosis    Hypothyroidism, HLD, GERD- stable on current meds      PT/OT    Full Code  : [place](Daughter) [phone]    Code status: FULL  DVT prophylaxis: lovenox    Care Plan discussed with: Patient/Family  Anticipated Disposition: SNF/LTC  Anticipated Discharge: Greater than 48 hours     Hospital Problems  Never Reviewed        Codes Class Noted POA    Delirium ICD-10-CM: R41.0  ICD-9-CM: 780.09  8/14/2021 Unknown        Cerebrovascular accident (CVA) due to bilateral embolism of posterior cerebral arteries [facility]) ICD-10-CM: M67.067  ICD-9-CM: 434.11  8/14/2021 Unknown                Review of Systems:   A comprehensive review of systems was negative except for that written in the HPI. Vital Signs:    Last 24hrs VS reviewed since prior progress note. Most recent are:  Visit Vitals  BP (!) 144/79 (BP 1 Location: Right arm, BP Patient Position: At rest)   Pulse 74   Temp 99.6 °F (37.6 °C)   Resp 18   Ht 6' 1\" (1.854 m)   Wt 113.4 kg (250 lb)   SpO2 97%   BMI 32.98 kg/m²       No intake or output data in the 24 hours ending 08/14/21 4057     Physical Examination:           Constitutional:  confused with hallucinations today   HEENT:  normocephalic, Oral mucosa moist, wearing cervical collar, . Resp:  CTA bilaterally. No wheezing/rhonchi/rales. No accessory muscle use   CV:  Regular rhythm, normal rate, no murmurs, gallops, rubs    GI:  Soft, non distended, non tender. normoactive bowel sounds, no hepatosplenomegaly     Musculoskeletal:  functional quadriplegia    Neurologic:  awake and alert, ?  Slurring of speech, unable to move extremities but this is not new per patient            Data Review:    Review and/or order of clinical lab test  Review and/or order of tests in the radiology section of CPT  Review and/or order of tests in the medicine section of CPT    Results     Procedure Component Value Units Date/Time    CULTURE, URINE [[id number]]  (Abnormal)  (Susceptibility) Collected: 08/11/21 1743    Order Status: Completed Specimen: Cath Urine Updated: 08/14/21 0913     Special Requests: NO SPECIAL REQUESTS        [place] Count -- 45865  COLONIES/mL       Culture result: CITROBACTER YOUNGAE               ENTEROBACTER CLOACAE COMPLEX          Susceptibility      Citrobacter youngae Enterobacter cloacae complex      WENDI WENDI      Amikacin ($) Susceptible Susceptible      Cefazolin ($) Resistant Resistant      Cefepime ($$) Susceptible Susceptible      Cefoxitin Resistant Resistant      Ceftazidime ($) Resistant Resistant      Ceftriaxone ($) Resistant Resistant      Ciprofloxacin ($) Susceptible Susceptible      Gentamicin ($) Susceptible Susceptible      Levofloxacin ($)  Susceptible      Meropenem ($$) Susceptible Susceptible      Nitrofurantoin Susceptible Intermediate      Piperacillin/Tazobac ($) Resistant Resistant      Tobramycin ($) Susceptible Susceptible      Trimeth/Sulfa  Susceptible                 Linear View                   CULTURE, URINE [[id number]]     Order Status: Canceled Specimen: Cath Urine     CULTURE, BLOOD, PAIRED [[id number]] Collected: 08/11/21 1112    Order Status: Completed Specimen: Blood Updated: 08/14/21 1107     Special Requests: NO SPECIAL REQUESTS        Culture result: NO GROWTH 3 DAYS       URINE CULTURE HOLD SAMPLE [[id number]] Collected: 08/11/21 1112    Order Status: Completed Specimen: Serum Updated: 08/11/21 1134     Urine culture hold       Urine on hold in Microbiology dept for 2 days. If unpreserved urine is submitted, it cannot be used for addtional testing after 24 hours, recollection will be required. MRI Brain:  Multiple scattered acute cortical infarcts in the bilateral parietal lobes,  right larger than left. No evidence of acute hemorrhage.     The ventricles are normal in size and position. Scattered periventricular and  deep white matter T2/FLAIR hyperintensities, consistent with mild chronic  microvascular ischemic disease. There is no extra-axial fluid collection or mass  effect. There is no cerebellar tonsillar herniation.  Expected arterial  flow-voids are present.     The paranasal sinuses, mastoid air cells, and middle ears are clear. The orbital  contents are within normal limits with bilateral lens implants. No significant  osseous or scalp lesions are identified.     MRA Head:  There is no evidence of large vessel occlusion or flow-limiting stenosis of the  intracranial internal carotid, anterior cerebral, and middle cerebral arteries. The anterior communicating artery is patent.      There is no evidence of large vessel occlusion or flow-limiting stenosis of the  intracranial vertebral arteries, basilar artery, or posterior cerebral arteries. The posterior communicating arteries are patent, left larger than right.      There is no evidence of aneurysm or vascular malformation.     IMPRESSION  MRI Brain:  1. Multiple scattered acute cortical infarcts in the bilateral parietal lobes,  right larger than left. 2. Mild chronic microvascular ischemic disease.     MRA Head:  1. No evidence of significant stenosis or aneurysm. Labs:     Recent Labs     08/12/21  0530   WBC 10.7   HGB 10.8*   HCT 33.1*        Recent Labs     08/12/21  0530      K 3.5      CO2 30   BUN 31*   CREA 0.56*   GLU 73   CA 9.1   MG 1.7     No results for input(s): ALT, AP, TBIL, TBILI, TP, ALB, GLOB, GGT, AML, LPSE in the last 72 hours. No lab exists for component: SGOT, GPT, AMYP, HLPSE  No results for input(s): INR, PTP, APTT, INREXT, INREXT in the last 72 hours. No results for input(s): FE, TIBC, PSAT, FERR in the last 72 hours. No results found for: FOL, RBCF   No results for input(s): PH, PCO2, PO2 in the last 72 hours.   Recent Labs     08/12/21  1137   CPK 71   CKNDX 2.1   TROIQ <0.05     Lab Results   Component Value Date/Time    Cholesterol, total 127 08/13/2021 03:15 AM    HDL Cholesterol 50 08/13/2021 03:15 AM    LDL, calculated 47.4 08/13/2021 03:15 AM    Triglyceride 148 08/13/2021 03:15 AM    CHOL/HDL Ratio 2.5 08/13/2021 03:15 AM     Lab Results   Component Value Date/Time    Glucose (POC) 95 08/14/2021 12:10 AM    Glucose (POC) 83 08/12/2021 06:38 AM    Glucose (POC) 82 08/11/2021 09:32 PM     Lab Results   Component Value Date/Time    Color DARK YELLOW 08/11/2021 11:02 AM    Appearance CLOUDY (A) 08/11/2021 11:02 AM    Specific gravity 1.024 08/11/2021 11:02 AM    pH (UA) 5.0 08/11/2021 11:02 AM    Protein 100 (A) 08/11/2021 11:02 AM    Glucose Negative 08/11/2021 11:02 AM    Ketone Negative 08/11/2021 11:02 AM    Urobilinogen 0.2 08/11/2021 11:02 AM    Nitrites Positive (A) 08/11/2021 11:02 AM    Leukocyte Esterase MODERATE (A) 08/11/2021 11:02 AM    Epithelial cells FEW 08/11/2021 11:02 AM    Bacteria 1+ (A) 08/11/2021 11:02 AM    WBC >100 (H) 08/11/2021 11:02 AM    RBC 5-10 08/11/2021 11:02 AM         Medications Reviewed:     Current Facility-Administered Medications   Medication Dose Route Frequency    nitroglycerin (NITROSTAT) tablet 0.4 mg  0.4 mg SubLINGual PRN    meropenem (MERREM) 500 mg in 0.9% sodium chloride (MBP/ADV) 50 mL MBP  0.5 g IntraVENous Q6H    balsam peru-castor oiL (VENELEX) ointment   Topical Q8H    aspirin chewable tablet 81 mg  81 mg Oral DAILY    sodium chloride (NS) flush 5-40 mL  5-40 mL IntraVENous Q8H    sodium chloride (NS) flush 5-40 mL  5-40 mL IntraVENous PRN    acetaminophen (TYLENOL) tablet 650 mg  650 mg Oral Q6H PRN    Or    acetaminophen (TYLENOL) suppository 650 mg  650 mg Rectal Q6H PRN    polyethylene glycol (MIRALAX) packet 17 g  17 g Oral DAILY PRN    ondansetron (ZOFRAN) injection 4 mg  4 mg IntraVENous Q6H PRN    enoxaparin (LOVENOX) injection 40 mg  40 mg SubCUTAneous DAILY    albuterol (PROVENTIL VENTOLIN) nebulizer solution 1.25 mg  1.25 mg Nebulization Q6H PRN    atorvastatin (LIPITOR) tablet 20 mg  20 mg Oral DAILY    baclofen (LIORESAL) tablet 10 mg  10 mg Oral QID    bisacodyL (DULCOLAX) suppository 10 mg  10 mg Rectal DAILY    calcium carbonate (TUMS) chewable tablet 200 mg [elemental]  200 mg Oral TID  cyanocobalamin (VITAMIN B12) tablet 100 mcg  100 mcg Oral DAILY    levothyroxine (SYNTHROID) tablet 125 mcg  125 mcg Oral ACB    lidocaine (XYLOCAINE) 4 % cream   Topical DAILY    cetirizine (ZYRTEC) tablet 10 mg  10 mg Oral DAILY    magnesium oxide (MAG-OX) tablet 400 mg  400 mg Oral DAILY    melatonin tablet 3 mg  3 mg Oral QHS    ondansetron (ZOFRAN ODT) tablet 4 mg  4 mg Oral Q4H PRN    pantoprazole (PROTONIX) tablet 40 mg  40 mg Oral ACB&D    nitroglycerin (NITROBID) 2 % ointment 1 Inch  1 Inch Topical Q15MIN PRN    nystatin (MYCOSTATIN) 100,000 unit/gram ointment   Topical BID    senna-docusate (PERICOLACE) 8.6-50 mg per tablet 2 Tablet  2 Tablet Oral DAILY     ______________________________________________________________________  EXPECTED LENGTH OF STAY: 3d 19h  ACTUAL LENGTH OF STAY:          3                 Riaz Medrano MD

## 2021-08-15 NOTE — PROGRESS NOTES
Pt diaphoretic, eyes notably swollen, RN paged NP. NP ordered RN to continue with the plan of care, BP stable at 143/77. Pt remaining A/Ox1-2, continuing to have auditory and visual hallucinations. 0500-Pt started yelling as he talked to his hallucinations.

## 2021-08-16 LAB
BACTERIA SPEC CULT: NORMAL
SERVICE CMNT-IMP: NORMAL

## 2021-08-16 PROCEDURE — 74011250636 HC RX REV CODE- 250/636: Performed by: INTERNAL MEDICINE

## 2021-08-16 PROCEDURE — 77010033711 HC HIGH FLOW OXYGEN

## 2021-08-16 PROCEDURE — 74011250637 HC RX REV CODE- 250/637: Performed by: INTERNAL MEDICINE

## 2021-08-16 PROCEDURE — 94760 N-INVAS EAR/PLS OXIMETRY 1: CPT

## 2021-08-16 PROCEDURE — 65660000000 HC RM CCU STEPDOWN

## 2021-08-16 PROCEDURE — 74011250636 HC RX REV CODE- 250/636: Performed by: FAMILY MEDICINE

## 2021-08-16 PROCEDURE — 74011250637 HC RX REV CODE- 250/637: Performed by: FAMILY MEDICINE

## 2021-08-16 PROCEDURE — 74011000258 HC RX REV CODE- 258: Performed by: FAMILY MEDICINE

## 2021-08-16 RX ORDER — BACLOFEN 10 MG/1
5 TABLET ORAL 4 TIMES DAILY
Status: DISCONTINUED | OUTPATIENT
Start: 2021-08-16 | End: 2021-08-18 | Stop reason: HOSPADM

## 2021-08-16 RX ADMIN — Medication 10 ML: at 15:52

## 2021-08-16 RX ADMIN — MEROPENEM 500 MG: 500 INJECTION, POWDER, FOR SOLUTION INTRAVENOUS at 21:16

## 2021-08-16 RX ADMIN — CETIRIZINE HYDROCHLORIDE 10 MG: 10 TABLET, FILM COATED ORAL at 09:38

## 2021-08-16 RX ADMIN — MEROPENEM 500 MG: 500 INJECTION, POWDER, FOR SOLUTION INTRAVENOUS at 09:39

## 2021-08-16 RX ADMIN — DOCUSATE SODIUM 50 MG AND SENNOSIDES 8.6 MG 2 TABLET: 8.6; 5 TABLET, FILM COATED ORAL at 09:38

## 2021-08-16 RX ADMIN — BISACODYL 10 MG: 10 SUPPOSITORY RECTAL at 09:39

## 2021-08-16 RX ADMIN — MEROPENEM 500 MG: 500 INJECTION, POWDER, FOR SOLUTION INTRAVENOUS at 15:51

## 2021-08-16 RX ADMIN — NYSTATIN OINTMENT: 100000 OINTMENT TOPICAL at 18:59

## 2021-08-16 RX ADMIN — ENOXAPARIN SODIUM 40 MG: 40 INJECTION SUBCUTANEOUS at 09:39

## 2021-08-16 RX ADMIN — BACLOFEN 10 MG: 10 TABLET ORAL at 09:38

## 2021-08-16 RX ADMIN — Medication 100 MCG: at 09:38

## 2021-08-16 RX ADMIN — MEROPENEM 500 MG: 500 INJECTION, POWDER, FOR SOLUTION INTRAVENOUS at 05:22

## 2021-08-16 RX ADMIN — Medication: at 05:23

## 2021-08-16 RX ADMIN — BACLOFEN 10 MG: 10 TABLET ORAL at 13:54

## 2021-08-16 RX ADMIN — NYSTATIN OINTMENT: 100000 OINTMENT TOPICAL at 13:54

## 2021-08-16 RX ADMIN — Medication: at 21:17

## 2021-08-16 RX ADMIN — ATORVASTATIN CALCIUM 20 MG: 20 TABLET, FILM COATED ORAL at 09:39

## 2021-08-16 RX ADMIN — Medication 10 ML: at 05:23

## 2021-08-16 RX ADMIN — Medication 400 MG: at 09:38

## 2021-08-16 RX ADMIN — Medication 10 ML: at 21:17

## 2021-08-16 RX ADMIN — CALCIUM CARBONATE (ANTACID) CHEW TAB 500 MG 200 MG: 500 CHEW TAB at 09:38

## 2021-08-16 RX ADMIN — ASPIRIN 81 MG: 81 TABLET, CHEWABLE ORAL at 09:38

## 2021-08-16 RX ADMIN — Medication: at 13:54

## 2021-08-16 RX ADMIN — PANTOPRAZOLE SODIUM 40 MG: 40 TABLET, DELAYED RELEASE ORAL at 08:06

## 2021-08-16 RX ADMIN — LEVOTHYROXINE SODIUM 125 MCG: 0.12 TABLET ORAL at 08:06

## 2021-08-16 NOTE — PROGRESS NOTES
[facility]  Hospitalist Group                                                                                          Hospitalist Progress Note  Melina Bean MD  Answering service: [phone] OR [phone] from in house phone     NAME:  Berhane Pang  :  1950  MRN:  [id number]      Admission Summary: This is a 71-year gentleman with past medical history of tetraplegia 2/2  spinal cord injury, C5 AIS B following GLF, after surgery for cervical stenosis on 4/15/21 at [facility]. 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 He recently finished course of antibiotics for UTI.     Patient patient is now brought for evaluation of confusion, hallucinations. As per daughter, patient is oriented x3 at baseline, however since last 4 days he has been confused and for last 2 days he has been having visual and auditory hallucinations. Daughter also reported decreased appetite. Interval history / Subjective:   Patient confused with some hallucinations  Brain MRI showing acute CVA-   Changed antibiotics     Assessment & Plan:      Altered mental status secondary to UTI and acute CVA-   Changed abx yesterday    UTI- cultures showing resistance to ceftriaxone- changed to IV meropenem  Patient is on scheduled straight catheterization every 6 hours     Quadriplegia due to previous high cervical spinal cord injury,   Autonomic dysreflexia, neurogenic bowel and bladder- OT/PT  Continue c-collar for now, has follow-up appointment with [facility] neurosurgery on     Acute CVA- neurology consulted and following  cont 81mg aspirin, cont statin- LDL at goal   ECHO w/ bubble study- was negative for PFO  Cont tele monitoring, carotid dopplers neg for critical stenosis    Hypothyroidism, HLD, GERD- stable on current meds      PT/OT    Full Code  : [place](Daughter) [phone]    Code status: FULL  DVT prophylaxis: lovenox    Care Plan discussed with: Patient/Family  Anticipated Disposition: SNF/LTC  Anticipated Discharge: Greater than 48 hours     Hospital Problems  Never Reviewed        Codes Class Noted POA    Delirium ICD-10-CM: R41.0  ICD-9-CM: 780.09  8/14/2021 Unknown        Cerebrovascular accident (CVA) due to bilateral embolism of posterior cerebral arteries [facility]) ICD-10-CM: B25.571  ICD-9-CM: 434.11  8/14/2021 Unknown                Review of Systems:   A comprehensive review of systems was negative except for that written in the HPI. Vital Signs:    Last 24hrs VS reviewed since prior progress note. Most recent are:  Visit Vitals  /77 (BP 1 Location: Right upper arm)   Pulse (!) 103   Temp 98.7 °F (37.1 °C)   Resp 16   Ht 6' 1\" (1.854 m)   Wt 113.4 kg (250 lb)   SpO2 99%   BMI 32.98 kg/m²       No intake or output data in the 24 hours ending 08/16/21 5157     Physical Examination:           Constitutional:  confused with hallucinations today   HEENT:  normocephalic, Oral mucosa moist, wearing cervical collar, . Resp:  CTA bilaterally. No wheezing/rhonchi/rales. No accessory muscle use   CV:  Regular rhythm, normal rate, no murmurs, gallops, rubs    GI:  Soft, non distended, non tender. normoactive bowel sounds, no hepatosplenomegaly     Musculoskeletal:  functional quadriplegia    Neurologic:  awake and alert, ?  Slurring of speech, unable to move extremities but this is not new per patient            Data Review:    Review and/or order of clinical lab test  Review and/or order of tests in the radiology section of CPT  Review and/or order of tests in the medicine section of CPT    Results     Procedure Component Value Units Date/Time    CULTURE, URINE [[id number]]  (Abnormal)  (Susceptibility) Collected: 08/11/21 1743    Order Status: Completed Specimen: Cath Urine Updated: 08/14/21 0913     Special Requests: NO SPECIAL REQUESTS        [place] Count --        75545  COLONIES/mL       Culture result: CITROBACTER YOUNGAE               ENTEROBACTER CLOACAE COMPLEX          Susceptibility      Citrobacter youngae Enterobacter cloacae complex      WENDI WENDI      Amikacin ($) Susceptible Susceptible      Cefazolin ($) Resistant Resistant      Cefepime ($$) Susceptible Susceptible      Cefoxitin Resistant Resistant      Ceftazidime ($) Resistant Resistant      Ceftriaxone ($) Resistant Resistant      Ciprofloxacin ($) Susceptible Susceptible      Gentamicin ($) Susceptible Susceptible      Levofloxacin ($)  Susceptible      Meropenem ($$) Susceptible Susceptible      Nitrofurantoin Susceptible Intermediate      Piperacillin/Tazobac ($) Resistant Resistant      Tobramycin ($) Susceptible Susceptible      Trimeth/Sulfa  Susceptible                 Linear View                   CULTURE, URINE [[id number]]     Order Status: Canceled Specimen: Cath Urine     CULTURE, BLOOD, PAIRED [[id number]] Collected: 08/11/21 1112    Order Status: Completed Specimen: Blood Updated: 08/16/21 0503     Special Requests: NO SPECIAL REQUESTS        Culture result: NO GROWTH 5 DAYS       URINE CULTURE HOLD SAMPLE [[id number]] Collected: 08/11/21 1112    Order Status: Completed Specimen: Serum Updated: 08/11/21 1134     Urine culture hold       Urine on hold in Microbiology dept for 2 days. If unpreserved urine is submitted, it cannot be used for addtional testing after 24 hours, recollection will be required. MRI Brain:  Multiple scattered acute cortical infarcts in the bilateral parietal lobes,  right larger than left. No evidence of acute hemorrhage.     The ventricles are normal in size and position. Scattered periventricular and  deep white matter T2/FLAIR hyperintensities, consistent with mild chronic  microvascular ischemic disease. There is no extra-axial fluid collection or mass  effect. There is no cerebellar tonsillar herniation.  Expected arterial  flow-voids are present.     The paranasal sinuses, mastoid air cells, and middle ears are clear. The orbital  contents are within normal limits with bilateral lens implants. No significant  osseous or scalp lesions are identified.     MRA Head:  There is no evidence of large vessel occlusion or flow-limiting stenosis of the  intracranial internal carotid, anterior cerebral, and middle cerebral arteries. The anterior communicating artery is patent.      There is no evidence of large vessel occlusion or flow-limiting stenosis of the  intracranial vertebral arteries, basilar artery, or posterior cerebral arteries. The posterior communicating arteries are patent, left larger than right.      There is no evidence of aneurysm or vascular malformation.     IMPRESSION  MRI Brain:  1. Multiple scattered acute cortical infarcts in the bilateral parietal lobes,  right larger than left. 2. Mild chronic microvascular ischemic disease.     MRA Head:  1. No evidence of significant stenosis or aneurysm. Labs:     No results for input(s): WBC, HGB, HCT, PLT, HGBEXT, HCTEXT, PLTEXT, HGBEXT, HCTEXT, PLTEXT in the last 72 hours. Recent Labs     08/15/21  0704      K 3.4*      CO2 31   BUN 18   CREA 0.45*   *   CA 8.7     No results for input(s): ALT, AP, TBIL, TBILI, TP, ALB, GLOB, GGT, AML, LPSE in the last 72 hours. No lab exists for component: SGOT, GPT, AMYP, HLPSE  No results for input(s): INR, PTP, APTT, INREXT, INREXT in the last 72 hours. No results for input(s): FE, TIBC, PSAT, FERR in the last 72 hours. No results found for: FOL, RBCF   No results for input(s): PH, PCO2, PO2 in the last 72 hours. No results for input(s): CPK, CKNDX, TROIQ in the last 72 hours.     No lab exists for component: CPKMB  Lab Results   Component Value Date/Time    Cholesterol, total 127 08/13/2021 03:15 AM    HDL Cholesterol 50 08/13/2021 03:15 AM    LDL, calculated 47.4 08/13/2021 03:15 AM    Triglyceride 148 08/13/2021 03:15 AM    CHOL/HDL Ratio 2.5 08/13/2021 03:15 AM Lab Results   Component Value Date/Time    Glucose (POC) 103 08/15/2021 04:31 AM    Glucose (POC) 95 08/14/2021 12:10 AM    Glucose (POC) 83 08/12/2021 06:38 AM    Glucose (POC) 82 08/11/2021 09:32 PM     Lab Results   Component Value Date/Time    Color DARK YELLOW 08/11/2021 11:02 AM    Appearance CLOUDY (A) 08/11/2021 11:02 AM    Specific gravity 1.024 08/11/2021 11:02 AM    pH (UA) 5.0 08/11/2021 11:02 AM    Protein 100 (A) 08/11/2021 11:02 AM    Glucose Negative 08/11/2021 11:02 AM    Ketone Negative 08/11/2021 11:02 AM    Urobilinogen 0.2 08/11/2021 11:02 AM    Nitrites Positive (A) 08/11/2021 11:02 AM    Leukocyte Esterase MODERATE (A) 08/11/2021 11:02 AM    Epithelial cells FEW 08/11/2021 11:02 AM    Bacteria 1+ (A) 08/11/2021 11:02 AM    WBC >100 (H) 08/11/2021 11:02 AM    RBC 5-10 08/11/2021 11:02 AM         Medications Reviewed:     Current Facility-Administered Medications   Medication Dose Route Frequency    nitroglycerin (NITROSTAT) tablet 0.4 mg  0.4 mg SubLINGual PRN    meropenem (MERREM) 500 mg in 0.9% sodium chloride (MBP/ADV) 50 mL MBP  0.5 g IntraVENous Q6H    balsam peru-castor oiL (VENELEX) ointment   Topical Q8H    aspirin chewable tablet 81 mg  81 mg Oral DAILY    sodium chloride (NS) flush 5-40 mL  5-40 mL IntraVENous Q8H    sodium chloride (NS) flush 5-40 mL  5-40 mL IntraVENous PRN    acetaminophen (TYLENOL) tablet 650 mg  650 mg Oral Q6H PRN    Or    acetaminophen (TYLENOL) suppository 650 mg  650 mg Rectal Q6H PRN    polyethylene glycol (MIRALAX) packet 17 g  17 g Oral DAILY PRN    ondansetron (ZOFRAN) injection 4 mg  4 mg IntraVENous Q6H PRN    enoxaparin (LOVENOX) injection 40 mg  40 mg SubCUTAneous DAILY    albuterol (PROVENTIL VENTOLIN) nebulizer solution 1.25 mg  1.25 mg Nebulization Q6H PRN    atorvastatin (LIPITOR) tablet 20 mg  20 mg Oral DAILY    baclofen (LIORESAL) tablet 10 mg  10 mg Oral QID    bisacodyL (DULCOLAX) suppository 10 mg  10 mg Rectal DAILY    calcium carbonate (TUMS) chewable tablet 200 mg [elemental]  200 mg Oral TID    cyanocobalamin (VITAMIN B12) tablet 100 mcg  100 mcg Oral DAILY    levothyroxine (SYNTHROID) tablet 125 mcg  125 mcg Oral ACB    lidocaine (XYLOCAINE) 4 % cream   Topical DAILY    cetirizine (ZYRTEC) tablet 10 mg  10 mg Oral DAILY    magnesium oxide (MAG-OX) tablet 400 mg  400 mg Oral DAILY    melatonin tablet 3 mg  3 mg Oral QHS    ondansetron (ZOFRAN ODT) tablet 4 mg  4 mg Oral Q4H PRN    pantoprazole (PROTONIX) tablet 40 mg  40 mg Oral ACB&D    nitroglycerin (NITROBID) 2 % ointment 1 Inch  1 Inch Topical Q15MIN PRN    nystatin (MYCOSTATIN) 100,000 unit/gram ointment   Topical BID    senna-docusate (PERICOLACE) 8.6-50 mg per tablet 2 Tablet  2 Tablet Oral DAILY     ______________________________________________________________________  EXPECTED LENGTH OF STAY: 3d 19h  ACTUAL LENGTH OF STAY:          5                 Moni Rodney MD

## 2021-08-17 ENCOUNTER — APPOINTMENT (OUTPATIENT)
Dept: GENERAL RADIOLOGY | Age: 71
DRG: 064 | End: 2021-08-17
Attending: FAMILY MEDICINE
Payer: MEDICARE

## 2021-08-17 LAB
ANION GAP SERPL CALC-SCNC: 4 MMOL/L (ref 5–15)
APPEARANCE UR: CLEAR
BACTERIA URNS QL MICRO: NEGATIVE /HPF
BILIRUB UR QL: NEGATIVE
BUN SERPL-MCNC: 44 MG/DL (ref 6–20)
BUN/CREAT SERPL: 28 (ref 12–20)
CALCIUM SERPL-MCNC: 9.6 MG/DL (ref 8.5–10.1)
CHLORIDE SERPL-SCNC: 106 MMOL/L (ref 97–108)
CO2 SERPL-SCNC: 30 MMOL/L (ref 21–32)
COLOR UR: ABNORMAL
CREAT SERPL-MCNC: 1.58 MG/DL (ref 0.7–1.3)
EPITH CASTS URNS QL MICRO: ABNORMAL /LPF
ERYTHROCYTE [DISTWIDTH] IN BLOOD BY AUTOMATED COUNT: 15.7 % (ref 11.5–14.5)
GLUCOSE SERPL-MCNC: 120 MG/DL (ref 65–100)
GLUCOSE UR STRIP.AUTO-MCNC: NEGATIVE MG/DL
HCT VFR BLD AUTO: 36.5 % (ref 36.6–50.3)
HGB BLD-MCNC: 11.6 G/DL (ref 12.1–17)
HGB UR QL STRIP: NEGATIVE
KETONES UR QL STRIP.AUTO: ABNORMAL MG/DL
LEUKOCYTE ESTERASE UR QL STRIP.AUTO: NEGATIVE
MCH RBC QN AUTO: 30.6 PG (ref 26–34)
MCHC RBC AUTO-ENTMCNC: 31.8 G/DL (ref 30–36.5)
MCV RBC AUTO: 96.3 FL (ref 80–99)
NITRITE UR QL STRIP.AUTO: NEGATIVE
NRBC # BLD: 0 K/UL (ref 0–0.01)
NRBC BLD-RTO: 0 PER 100 WBC
PH UR STRIP: 5.5 [PH] (ref 5–8)
PLATELET # BLD AUTO: 225 K/UL (ref 150–400)
PMV BLD AUTO: 9.6 FL (ref 8.9–12.9)
POTASSIUM SERPL-SCNC: 4.3 MMOL/L (ref 3.5–5.1)
PROT UR STRIP-MCNC: 30 MG/DL
RBC # BLD AUTO: 3.79 M/UL (ref 4.1–5.7)
RBC #/AREA URNS HPF: ABNORMAL /HPF (ref 0–5)
SODIUM SERPL-SCNC: 140 MMOL/L (ref 136–145)
SP GR UR REFRACTOMETRY: 1.02 (ref 1–1.03)
T4 FREE SERPL-MCNC: 0.9 NG/DL (ref 0.8–1.5)
TSH SERPL DL<=0.05 MIU/L-ACNC: 20 UIU/ML (ref 0.36–3.74)
UR CULT HOLD, URHOLD: NORMAL
UROBILINOGEN UR QL STRIP.AUTO: 0.2 EU/DL (ref 0.2–1)
WBC # BLD AUTO: 14.4 K/UL (ref 4.1–11.1)
WBC URNS QL MICRO: ABNORMAL /HPF (ref 0–4)

## 2021-08-17 PROCEDURE — 94664 DEMO&/EVAL PT USE INHALER: CPT

## 2021-08-17 PROCEDURE — 94760 N-INVAS EAR/PLS OXIMETRY 1: CPT

## 2021-08-17 PROCEDURE — 84439 ASSAY OF FREE THYROXINE: CPT

## 2021-08-17 PROCEDURE — 36415 COLL VENOUS BLD VENIPUNCTURE: CPT

## 2021-08-17 PROCEDURE — 65660000000 HC RM CCU STEPDOWN

## 2021-08-17 PROCEDURE — 74011000258 HC RX REV CODE- 258: Performed by: FAMILY MEDICINE

## 2021-08-17 PROCEDURE — 74011250636 HC RX REV CODE- 250/636: Performed by: INTERNAL MEDICINE

## 2021-08-17 PROCEDURE — 81001 URINALYSIS AUTO W/SCOPE: CPT

## 2021-08-17 PROCEDURE — 80048 BASIC METABOLIC PNL TOTAL CA: CPT

## 2021-08-17 PROCEDURE — 74011000250 HC RX REV CODE- 250: Performed by: INTERNAL MEDICINE

## 2021-08-17 PROCEDURE — 74011000250 HC RX REV CODE- 250: Performed by: FAMILY MEDICINE

## 2021-08-17 PROCEDURE — 94640 AIRWAY INHALATION TREATMENT: CPT

## 2021-08-17 PROCEDURE — 77010033678 HC OXYGEN DAILY

## 2021-08-17 PROCEDURE — 83735 ASSAY OF MAGNESIUM: CPT

## 2021-08-17 PROCEDURE — 74011250636 HC RX REV CODE- 250/636: Performed by: FAMILY MEDICINE

## 2021-08-17 PROCEDURE — 77030029684 HC NEB SM VOL KT MONA -A

## 2021-08-17 PROCEDURE — 77030005513 HC CATH URETH FOL11 MDII -B

## 2021-08-17 PROCEDURE — 85027 COMPLETE CBC AUTOMATED: CPT

## 2021-08-17 PROCEDURE — 74011250637 HC RX REV CODE- 250/637: Performed by: INTERNAL MEDICINE

## 2021-08-17 PROCEDURE — 84443 ASSAY THYROID STIM HORMONE: CPT

## 2021-08-17 PROCEDURE — 74011250637 HC RX REV CODE- 250/637: Performed by: FAMILY MEDICINE

## 2021-08-17 RX ORDER — OXYCODONE HYDROCHLORIDE 5 MG/1
5 TABLET ORAL
Status: DISCONTINUED | OUTPATIENT
Start: 2021-08-17 | End: 2021-08-18 | Stop reason: HOSPADM

## 2021-08-17 RX ORDER — DEXTROSE MONOHYDRATE AND SODIUM CHLORIDE 5; .45 G/100ML; G/100ML
75 INJECTION, SOLUTION INTRAVENOUS CONTINUOUS
Status: DISCONTINUED | OUTPATIENT
Start: 2021-08-17 | End: 2021-08-18 | Stop reason: HOSPADM

## 2021-08-17 RX ORDER — MAGNESIUM SULFATE HEPTAHYDRATE 40 MG/ML
2 INJECTION, SOLUTION INTRAVENOUS ONCE
Status: COMPLETED | OUTPATIENT
Start: 2021-08-17 | End: 2021-08-17

## 2021-08-17 RX ADMIN — ACETAMINOPHEN 650 MG: 325 TABLET ORAL at 18:22

## 2021-08-17 RX ADMIN — BACLOFEN 5 MG: 10 TABLET ORAL at 21:48

## 2021-08-17 RX ADMIN — Medication: at 21:49

## 2021-08-17 RX ADMIN — MEROPENEM 500 MG: 500 INJECTION, POWDER, FOR SOLUTION INTRAVENOUS at 11:54

## 2021-08-17 RX ADMIN — BACLOFEN 5 MG: 10 TABLET ORAL at 17:29

## 2021-08-17 RX ADMIN — ALBUTEROL SULFATE 1.25 MG: 2.5 SOLUTION RESPIRATORY (INHALATION) at 13:52

## 2021-08-17 RX ADMIN — NYSTATIN OINTMENT: 100000 OINTMENT TOPICAL at 17:34

## 2021-08-17 RX ADMIN — Medication 100 MCG: at 08:46

## 2021-08-17 RX ADMIN — CALCIUM CARBONATE (ANTACID) CHEW TAB 500 MG 200 MG: 500 CHEW TAB at 17:29

## 2021-08-17 RX ADMIN — MEROPENEM 500 MG: 500 INJECTION, POWDER, FOR SOLUTION INTRAVENOUS at 03:56

## 2021-08-17 RX ADMIN — DEXTROSE AND SODIUM CHLORIDE 75 ML/HR: 5; 450 INJECTION, SOLUTION INTRAVENOUS at 11:55

## 2021-08-17 RX ADMIN — PANTOPRAZOLE SODIUM 40 MG: 40 TABLET, DELAYED RELEASE ORAL at 06:41

## 2021-08-17 RX ADMIN — CALCIUM CARBONATE (ANTACID) CHEW TAB 500 MG 200 MG: 500 CHEW TAB at 08:46

## 2021-08-17 RX ADMIN — DOCUSATE SODIUM 50 MG AND SENNOSIDES 8.6 MG 2 TABLET: 8.6; 5 TABLET, FILM COATED ORAL at 08:45

## 2021-08-17 RX ADMIN — ATORVASTATIN CALCIUM 20 MG: 20 TABLET, FILM COATED ORAL at 08:46

## 2021-08-17 RX ADMIN — ENOXAPARIN SODIUM 40 MG: 40 INJECTION SUBCUTANEOUS at 08:44

## 2021-08-17 RX ADMIN — CETIRIZINE HYDROCHLORIDE 10 MG: 10 TABLET, FILM COATED ORAL at 08:46

## 2021-08-17 RX ADMIN — CALCIUM CARBONATE (ANTACID) CHEW TAB 500 MG 200 MG: 500 CHEW TAB at 21:48

## 2021-08-17 RX ADMIN — MAGNESIUM SULFATE HEPTAHYDRATE 2 G: 40 INJECTION, SOLUTION INTRAVENOUS at 08:45

## 2021-08-17 RX ADMIN — Medication: at 14:00

## 2021-08-17 RX ADMIN — OXYCODONE 5 MG: 5 TABLET ORAL at 19:13

## 2021-08-17 RX ADMIN — NYSTATIN OINTMENT: 100000 OINTMENT TOPICAL at 11:53

## 2021-08-17 RX ADMIN — LEVOTHYROXINE SODIUM 125 MCG: 0.12 TABLET ORAL at 06:41

## 2021-08-17 RX ADMIN — ASPIRIN 81 MG: 81 TABLET, CHEWABLE ORAL at 08:46

## 2021-08-17 RX ADMIN — BACLOFEN 5 MG: 10 TABLET ORAL at 08:45

## 2021-08-17 RX ADMIN — Medication 10 ML: at 21:49

## 2021-08-17 RX ADMIN — Medication 400 MG: at 08:46

## 2021-08-17 RX ADMIN — BACLOFEN 5 MG: 10 TABLET ORAL at 14:16

## 2021-08-17 RX ADMIN — PANTOPRAZOLE SODIUM 40 MG: 40 TABLET, DELAYED RELEASE ORAL at 17:29

## 2021-08-17 NOTE — PROGRESS NOTES
Bedside and Verbal shift change report given to [address] [place] [address] (oncoming nurse) by Ciro Preciado RN (offgoing nurse). Report included the following information SBAR, Kardex, Intake/Output, MAR and Recent Results.

## 2021-08-17 NOTE — PROGRESS NOTES
[facility] [facility] Adult  Hospitalist Group                                                                                          Hospitalist Progress Note  Moni Rodney MD  Answering service: [phone] OR [phone] from in house phone     NAME:  Tere Petit  :  1950  MRN:  [id number]      Admission Summary: This is a 71-year gentleman with past medical history of tetraplegia 2/2  spinal cord injury, C5 AIS B following GLF, after surgery for cervical stenosis on 4/15/21 at [facility]. 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 He recently finished course of antibiotics for UTI.     Patient patient is now brought for evaluation of confusion, hallucinations. As per daughter, patient is oriented x3 at baseline, however since last 4 days he has been confused and for last 2 days he has been having visual and auditory hallucinations. Daughter also reported decreased appetite. Interval history / Subjective:   Patient lethargic but confusion and hallucinations appear to have resolved  Brain MRI showing acute CVA-   Consult cardiology in am for holter monitor     Assessment & Plan:      Altered mental status secondary to UTI and acute CVA-   Waxing and waning symptoms    UTI- cultures showing resistance to ceftriaxone- changed to IV meropenem  Patient is on scheduled straight catheterization every 6 hours     Quadriplegia due to previous high cervical spinal cord injury,   Autonomic dysreflexia, neurogenic bowel and bladder- OT/PT  Continue c-collar for now, has follow-up appointment with [facility] neurosurgery on     Acute CVA- neurology consulted and following  cont 81mg aspirin, cont statin- LDL at goal   ECHO w/ bubble study- was negative for PFO  Cont tele monitoring, carotid dopplers neg for critical stenosis    Hypothyroidism, HLD, GERD- stable on current meds      PT/OT    Full Code  : [place](Daughter) [phone]    Code status: FULL  DVT prophylaxis: lovenox    Care Plan discussed with: Patient/Family  Anticipated Disposition: SNF/LTC  Anticipated Discharge: Greater than 48 hours     Hospital Problems  Never Reviewed        Codes Class Noted POA    Delirium ICD-10-CM: R41.0  ICD-9-CM: 780.09  8/14/2021 Unknown        Cerebrovascular accident (CVA) due to bilateral embolism of posterior cerebral arteries [facility]) ICD-10-CM: X63.559  ICD-9-CM: 434.11  8/14/2021 Unknown                Review of Systems:   A comprehensive review of systems was negative except for that written in the HPI. Vital Signs:    Last 24hrs VS reviewed since prior progress note. Most recent are:  Visit Vitals  BP (!) 101/54   Pulse 64   Temp 98.1 °F (36.7 °C)   Resp 20   Ht 6' 1\" (1.854 m)   Wt 113.4 kg (250 lb)   SpO2 93%   BMI 32.98 kg/m²     Patient Vitals for the past 24 hrs:   Temp Pulse Resp BP SpO2   08/16/21 2254 98.1 °F (36.7 °C) 64 20 (!) 101/54 93 %   08/16/21 2043 98.6 °F (37 °C) 68 28 (!) 168/83 93 %   08/16/21 2000  69      08/16/21 1800  75      08/16/21 1553 98.4 °F (36.9 °C)  18 (!) 153/81 92 %   08/16/21 1400  73      08/16/21 1200  81      08/16/21 1000  81      08/16/21 0818 99.7 °F (37.6 °C) 91 16 115/70 96 %   08/16/21 0800  92      08/15/21 2339 98.7 °F (37.1 °C) (!) 103 16 138/77 99 %       No intake or output data in the 24 hours ending 08/16/21 2313     Physical Examination:           Constitutional:  confused with hallucinations today   HEENT:  normocephalic, Oral mucosa moist, wearing cervical collar, . Resp:  CTA bilaterally. No wheezing/rhonchi/rales. No accessory muscle use   CV:  Regular rhythm, normal rate, no murmurs, gallops, rubs    GI:  Soft, non distended, non tender. normoactive bowel sounds, no hepatosplenomegaly     Musculoskeletal:  functional quadriplegia    Neurologic:  awake and alert, ?  Slurring of speech, unable to move extremities but this is not new per patient            Data Review:    Review and/or order of clinical lab test  Review and/or order of tests in the radiology section of CPT  Review and/or order of tests in the medicine section of CPT    Results     Procedure Component Value Units Date/Time    CULTURE, URINE [[id number]]  (Abnormal)  (Susceptibility) Collected: 08/11/21 1743    Order Status: Completed Specimen: Cath Urine Updated: 08/14/21 0913     Special Requests: NO SPECIAL REQUESTS        [place] Count --        [id number]  COLONIES/mL       Culture result: CITROBACTER YOUNGAE               ENTEROBACTER CLOACAE COMPLEX          Susceptibility      Citrobacter youngae Enterobacter cloacae complex      WENDI WENDI      Amikacin ($) Susceptible Susceptible      Cefazolin ($) Resistant Resistant      Cefepime ($$) Susceptible Susceptible      Cefoxitin Resistant Resistant      Ceftazidime ($) Resistant Resistant      Ceftriaxone ($) Resistant Resistant      Ciprofloxacin ($) Susceptible Susceptible      Gentamicin ($) Susceptible Susceptible      Levofloxacin ($)  Susceptible      Meropenem ($$) Susceptible Susceptible      Nitrofurantoin Susceptible Intermediate      Piperacillin/Tazobac ($) Resistant Resistant      Tobramycin ($) Susceptible Susceptible      Trimeth/Sulfa  Susceptible                 Linear View                   CULTURE, URINE [[id number]]     Order Status: Canceled Specimen: Cath Urine     CULTURE, BLOOD, PAIRED [[id number]] Collected: 08/11/21 1112    Order Status: Completed Specimen: Blood Updated: 08/16/21 0503     Special Requests: NO SPECIAL REQUESTS        Culture result: NO GROWTH 5 DAYS       URINE CULTURE HOLD SAMPLE [[id number]] Collected: 08/11/21 1112    Order Status: Completed Specimen: Serum Updated: 08/11/21 1134     Urine culture hold       Urine on hold in Microbiology dept for 2 days. If unpreserved urine is submitted, it cannot be used for addtional testing after 24 hours, recollection will be required. MRI Brain:  Multiple scattered acute cortical infarcts in the bilateral parietal lobes,  right larger than left. No evidence of acute hemorrhage.     The ventricles are normal in size and position. Scattered periventricular and  deep white matter T2/FLAIR hyperintensities, consistent with mild chronic  microvascular ischemic disease. There is no extra-axial fluid collection or mass  effect. There is no cerebellar tonsillar herniation. Expected arterial  flow-voids are present.     The paranasal sinuses, mastoid air cells, and middle ears are clear. The orbital  contents are within normal limits with bilateral lens implants. No significant  osseous or scalp lesions are identified.     MRA Head:  There is no evidence of large vessel occlusion or flow-limiting stenosis of the  intracranial internal carotid, anterior cerebral, and middle cerebral arteries. The anterior communicating artery is patent.      There is no evidence of large vessel occlusion or flow-limiting stenosis of the  intracranial vertebral arteries, basilar artery, or posterior cerebral arteries. The posterior communicating arteries are patent, left larger than right.      There is no evidence of aneurysm or vascular malformation.     IMPRESSION  MRI Brain:  1. Multiple scattered acute cortical infarcts in the bilateral parietal lobes,  right larger than left. 2. Mild chronic microvascular ischemic disease.     MRA Head:  1. No evidence of significant stenosis or aneurysm. Labs:     No results for input(s): WBC, HGB, HCT, PLT, HGBEXT, HCTEXT, PLTEXT, HGBEXT, HCTEXT, PLTEXT in the last 72 hours. Recent Labs     08/15/21  0704      K 3.4*      CO2 31   BUN 18   CREA 0.45*   *   CA 8.7     No results for input(s): ALT, AP, TBIL, TBILI, TP, ALB, GLOB, GGT, AML, LPSE in the last 72 hours. No lab exists for component: SGOT, GPT, AMYP, HLPSE  No results for input(s): INR, PTP, APTT, INREXT, INREXT in the last 72 hours.    No results for input(s): FE, TIBC, PSAT, FERR in the last 72 hours. No results found for: FOL, RBCF   No results for input(s): PH, PCO2, PO2 in the last 72 hours. No results for input(s): CPK, CKNDX, TROIQ in the last 72 hours.     No lab exists for component: CPKMB  Lab Results   Component Value Date/Time    Cholesterol, total 127 08/13/2021 03:15 AM    HDL Cholesterol 50 08/13/2021 03:15 AM    LDL, calculated 47.4 08/13/2021 03:15 AM    Triglyceride 148 08/13/2021 03:15 AM    CHOL/HDL Ratio 2.5 08/13/2021 03:15 AM     Lab Results   Component Value Date/Time    Glucose (POC) 103 08/15/2021 04:31 AM    Glucose (POC) 95 08/14/2021 12:10 AM    Glucose (POC) 83 08/12/2021 06:38 AM    Glucose (POC) 82 08/11/2021 09:32 PM     Lab Results   Component Value Date/Time    Color DARK YELLOW 08/11/2021 11:02 AM    Appearance CLOUDY (A) 08/11/2021 11:02 AM    Specific gravity 1.024 08/11/2021 11:02 AM    pH (UA) 5.0 08/11/2021 11:02 AM    Protein 100 (A) 08/11/2021 11:02 AM    Glucose Negative 08/11/2021 11:02 AM    Ketone Negative 08/11/2021 11:02 AM    Urobilinogen 0.2 08/11/2021 11:02 AM    Nitrites Positive (A) 08/11/2021 11:02 AM    Leukocyte Esterase MODERATE (A) 08/11/2021 11:02 AM    Epithelial cells FEW 08/11/2021 11:02 AM    Bacteria 1+ (A) 08/11/2021 11:02 AM    WBC >100 (H) 08/11/2021 11:02 AM    RBC 5-10 08/11/2021 11:02 AM         Medications Reviewed:     Current Facility-Administered Medications   Medication Dose Route Frequency    baclofen (LIORESAL) tablet 5 mg  5 mg Oral QID    nitroglycerin (NITROSTAT) tablet 0.4 mg  0.4 mg SubLINGual PRN    meropenem (MERREM) 500 mg in 0.9% sodium chloride (MBP/ADV) 50 mL MBP  0.5 g IntraVENous Q6H    balsam peru-castor oiL (VENELEX) ointment   Topical Q8H    aspirin chewable tablet 81 mg  81 mg Oral DAILY    sodium chloride (NS) flush 5-40 mL  5-40 mL IntraVENous Q8H    sodium chloride (NS) flush 5-40 mL  5-40 mL IntraVENous PRN    acetaminophen (TYLENOL) tablet 650 mg  650 mg Oral Q6H PRN    Or    acetaminophen (TYLENOL) suppository 650 mg  650 mg Rectal Q6H PRN    polyethylene glycol (MIRALAX) packet 17 g  17 g Oral DAILY PRN    ondansetron (ZOFRAN) injection 4 mg  4 mg IntraVENous Q6H PRN    enoxaparin (LOVENOX) injection 40 mg  40 mg SubCUTAneous DAILY    albuterol (PROVENTIL VENTOLIN) nebulizer solution 1.25 mg  1.25 mg Nebulization Q6H PRN    atorvastatin (LIPITOR) tablet 20 mg  20 mg Oral DAILY    bisacodyL (DULCOLAX) suppository 10 mg  10 mg Rectal DAILY    calcium carbonate (TUMS) chewable tablet 200 mg [elemental]  200 mg Oral TID    cyanocobalamin (VITAMIN B12) tablet 100 mcg  100 mcg Oral DAILY    levothyroxine (SYNTHROID) tablet 125 mcg  125 mcg Oral ACB    lidocaine (XYLOCAINE) 4 % cream   Topical DAILY    cetirizine (ZYRTEC) tablet 10 mg  10 mg Oral DAILY    magnesium oxide (MAG-OX) tablet 400 mg  400 mg Oral DAILY    [Held by provider] melatonin tablet 3 mg  3 mg Oral QHS    ondansetron (ZOFRAN ODT) tablet 4 mg  4 mg Oral Q4H PRN    pantoprazole (PROTONIX) tablet 40 mg  40 mg Oral ACB&D    nitroglycerin (NITROBID) 2 % ointment 1 Inch  1 Inch Topical Q15MIN PRN    nystatin (MYCOSTATIN) 100,000 unit/gram ointment   Topical BID    senna-docusate (PERICOLACE) 8.6-50 mg per tablet 2 Tablet  2 Tablet Oral DAILY     ______________________________________________________________________  EXPECTED LENGTH OF STAY: 3d 19h  ACTUAL LENGTH OF STAY:          5                 Quentin Yi MD

## 2021-08-17 NOTE — ROUTINE PROCESS
Bedside shift change report given to [address] [place] [address] and Judy RN (oncoming nurse) by Henrik Connolly RN and Shasha RN (offgoing nurse). Report included the following information SBAR and Kardex.

## 2021-08-17 NOTE — CONSULTS
Consult received for extended loop monitor. Pt seen by Dr. Boyd Phillips - pt more appropriate for 30 day event rather than ILR at this time. Appears pt will not be discharged today. Will arrange for placement of event monitor tomorrow.

## 2021-08-17 NOTE — PROGRESS NOTES
Patient is able to answer orientation questions but still displaying signs of confusion. Patient states staff and family are \"lying to him\" and refusing to eat until famly visits. Patient stated he would refuse all meds, food, and care. Family member on phone stated patient confused and gave consent for rasheed insertion. 1:13 PM  Patient c/o trouble breathing, Respiratory paged for PRN neb treatment. O2 97%, vitals stable.

## 2021-08-17 NOTE — PROGRESS NOTES
Physician Progress Note      PATIENT:               Marcia Maldonado  CSN #:                  [id number]  :                       1950  ADMIT DATE:       2021 10:42 AM  DISCH DATE:  RESPONDING  PROVIDER #:        Nirav Garduno MD          QUERY TEXT:    Patient admitted with CVA. Per Wound Care Nurse noted to also have pressure ulcer. If possible, please document in progress notes and discharge summary the location, present on admission status and stage of the pressure ulcer: The medical record reflects the following:  Risk Factors: quadriplegia, incontinence  Clinical Indicators: pt with Stage 1 pressure ulcer to coccyx per Wound Care Nsg notes  Treatment: Venelex, foam dressing, change q 3 days and prn, moisture mgmt. , turn and reposition q 2 hrs. elevate HOB 30 degrees to decrease pressure. Stage 1:  Non-blanchable erythema of intact skin  Stage 2:  Abrasion, Blister, Partial-thickness skin loss, with exposed dermis  Stage 3:  Full-thickness skin loss with damage or necrosis of subcutaneous tissue  Stage 4:  Full-thickness skin & soft tissue loss through to underlying muscle, tendon or bone  Unstageable: Obscured full-thickness skin & tissue loss    Thank you, if you have any questions please e-mail me at  [email or web address]  [phone]  Options provided:  -- Stage 1 Pressure Ulcer of Coccyx present on admission  -- Stage 1 Pressure Ulcer of Coccyx not present on admission  -- Other - I will add my own diagnosis  -- Disagree - Not applicable / Not valid  -- Disagree - Clinically unable to determine / Unknown  -- Refer to Clinical Documentation Reviewer    PROVIDER RESPONSE TEXT:    This patient has a stage 1 pressure ulcer of the Coccyx which was present on admission. Query created by: Anne-Marie Manuel on 2021 5:39 PM      QUERY TEXT:    Patient admitted with CVA. Per Wound Care, pt noted to also have pressure ulcer.  If possible, please document in progress notes and discharge summary the location, present on admission status and stage of the pressure ulcer: The medical record reflects the following:  Risk Factors: quadriplegia  Clinical Indicators: admitted with Unstageable pressure ulcer to right ankle that was covered with gray slough, with tan exudate. Ulcer POA per  documentation. Treatment: Venelex foam dressing, WC to follow, off load heels    Stage 1:  Non-blanchable erythema of intact skin  Stage 2:  Abrasion, Blister, Partial-thickness skin loss, with exposed dermis  Stage 3:  Full-thickness skin loss with damage or necrosis of subcutaneous tissue  Stage 4:  Full-thickness skin & soft tissue loss through to underlying muscle, tendon or bone  Unstageable: Obscured full-thickness skin & tissue loss    Thank you if you have any questions please e-mail me at  [email or web address]  [phone]  Options provided:  -- Unstageable Pressure Ulcer Right Ankle present on admission  -- Unstageable Pressure Ulcer of Right Ankle not present on admission  -- Other - I will add my own diagnosis  -- Disagree - Not applicable / Not valid  -- Disagree - Clinically unable to determine / Unknown  -- Refer to Clinical Documentation Reviewer    PROVIDER RESPONSE TEXT:    This patient has an Unstageable pressure ulcer of the Right Ankle that was present on admission.     Query created by: Sena Baxter on 8/13/2021 5:46 PM      Electronically signed by:  Hank Melgoza MD 8/17/2021 7:08 AM

## 2021-08-18 ENCOUNTER — APPOINTMENT (OUTPATIENT)
Dept: NON INVASIVE DIAGNOSTICS | Age: 71
DRG: 064 | End: 2021-08-18
Attending: NURSE PRACTITIONER
Payer: MEDICARE

## 2021-08-18 VITALS
RESPIRATION RATE: 16 BRPM | BODY MASS INDEX: 33.13 KG/M2 | HEIGHT: 73 IN | WEIGHT: 250 LBS | OXYGEN SATURATION: 97 % | DIASTOLIC BLOOD PRESSURE: 76 MMHG | SYSTOLIC BLOOD PRESSURE: 147 MMHG | TEMPERATURE: 98.7 F | HEART RATE: 62 BPM

## 2021-08-18 LAB
ANION GAP SERPL CALC-SCNC: 3 MMOL/L (ref 5–15)
BUN SERPL-MCNC: 31 MG/DL (ref 6–20)
BUN/CREAT SERPL: 61 (ref 12–20)
CALCIUM SERPL-MCNC: 8.7 MG/DL (ref 8.5–10.1)
CHLORIDE SERPL-SCNC: 105 MMOL/L (ref 97–108)
CO2 SERPL-SCNC: 30 MMOL/L (ref 21–32)
CREAT SERPL-MCNC: 0.51 MG/DL (ref 0.7–1.3)
GLUCOSE SERPL-MCNC: 98 MG/DL (ref 65–100)
MAGNESIUM SERPL-MCNC: 1.9 MG/DL (ref 1.6–2.4)
POTASSIUM SERPL-SCNC: 3.9 MMOL/L (ref 3.5–5.1)
SODIUM SERPL-SCNC: 138 MMOL/L (ref 136–145)

## 2021-08-18 PROCEDURE — 74011250636 HC RX REV CODE- 250/636: Performed by: INTERNAL MEDICINE

## 2021-08-18 PROCEDURE — 83735 ASSAY OF MAGNESIUM: CPT

## 2021-08-18 PROCEDURE — 80048 BASIC METABOLIC PNL TOTAL CA: CPT

## 2021-08-18 PROCEDURE — 74011250637 HC RX REV CODE- 250/637: Performed by: INTERNAL MEDICINE

## 2021-08-18 PROCEDURE — 93272 ECG/REVIEW INTERPRET ONLY: CPT | Performed by: SPECIALIST

## 2021-08-18 PROCEDURE — 36415 COLL VENOUS BLD VENIPUNCTURE: CPT

## 2021-08-18 PROCEDURE — 74011250637 HC RX REV CODE- 250/637: Performed by: FAMILY MEDICINE

## 2021-08-18 PROCEDURE — 93270 REMOTE 30 DAY ECG REV/REPORT: CPT

## 2021-08-18 PROCEDURE — 74011000250 HC RX REV CODE- 250: Performed by: FAMILY MEDICINE

## 2021-08-18 RX ORDER — POLYETHYLENE GLYCOL 3350 17 G/17G
17 POWDER, FOR SOLUTION ORAL DAILY
Qty: 30 PACKET | Refills: 0 | Status: SHIPPED
Start: 2021-08-18 | End: 2021-09-17

## 2021-08-18 RX ORDER — LEVOTHYROXINE SODIUM 75 UG/1
150 TABLET ORAL
Status: DISCONTINUED | OUTPATIENT
Start: 2021-08-19 | End: 2021-08-18 | Stop reason: HOSPADM

## 2021-08-18 RX ORDER — OXYCODONE HYDROCHLORIDE 5 MG/1
5 TABLET ORAL
Qty: 30 TABLET | Refills: 0 | Status: SHIPPED | OUTPATIENT
Start: 2021-08-18 | End: 2021-08-21

## 2021-08-18 RX ORDER — BACLOFEN 5 MG/1
5 TABLET ORAL 4 TIMES DAILY
Qty: 120 TABLET | Refills: 0 | Status: SHIPPED | OUTPATIENT
Start: 2021-08-18 | End: 2021-09-18

## 2021-08-18 RX ORDER — CIPROFLOXACIN 500 MG/1
500 TABLET ORAL 2 TIMES DAILY
Qty: 10 TABLET | Refills: 0 | Status: SHIPPED | OUTPATIENT
Start: 2021-08-18

## 2021-08-18 RX ORDER — LEVOTHYROXINE SODIUM 150 UG/1
150 TABLET ORAL
Qty: 30 TABLET | Refills: 4 | Status: SHIPPED | OUTPATIENT
Start: 2021-08-19

## 2021-08-18 RX ORDER — GUAIFENESIN 100 MG/5ML
81 LIQUID (ML) ORAL DAILY
Qty: 30 TABLET | Refills: 4 | Status: SHIPPED | OUTPATIENT
Start: 2021-08-19

## 2021-08-18 RX ORDER — BALSAM PERU/CASTOR OIL
OINTMENT (GRAM) TOPICAL EVERY 8 HOURS
Qty: 1 TUBE | Refills: 0 | Status: SHIPPED | OUTPATIENT
Start: 2021-08-18

## 2021-08-18 RX ADMIN — DOCUSATE SODIUM 50 MG AND SENNOSIDES 8.6 MG 2 TABLET: 8.6; 5 TABLET, FILM COATED ORAL at 09:56

## 2021-08-18 RX ADMIN — LIDOCAINE 4%: 4 CREAM TOPICAL at 10:11

## 2021-08-18 RX ADMIN — ENOXAPARIN SODIUM 40 MG: 40 INJECTION SUBCUTANEOUS at 09:57

## 2021-08-18 RX ADMIN — CALCIUM CARBONATE (ANTACID) CHEW TAB 500 MG 200 MG: 500 CHEW TAB at 09:57

## 2021-08-18 RX ADMIN — DEXTROSE AND SODIUM CHLORIDE 75 ML/HR: 5; 450 INJECTION, SOLUTION INTRAVENOUS at 00:47

## 2021-08-18 RX ADMIN — BACLOFEN 5 MG: 10 TABLET ORAL at 09:56

## 2021-08-18 RX ADMIN — ACETAMINOPHEN 650 MG: 325 TABLET ORAL at 10:15

## 2021-08-18 RX ADMIN — Medication: at 07:06

## 2021-08-18 RX ADMIN — OXYCODONE 5 MG: 5 TABLET ORAL at 00:59

## 2021-08-18 RX ADMIN — PANTOPRAZOLE SODIUM 40 MG: 40 TABLET, DELAYED RELEASE ORAL at 07:06

## 2021-08-18 RX ADMIN — LEVOTHYROXINE SODIUM 125 MCG: 0.12 TABLET ORAL at 07:06

## 2021-08-18 RX ADMIN — ASPIRIN 81 MG: 81 TABLET, CHEWABLE ORAL at 09:57

## 2021-08-18 RX ADMIN — CETIRIZINE HYDROCHLORIDE 10 MG: 10 TABLET, FILM COATED ORAL at 09:57

## 2021-08-18 RX ADMIN — Medication 400 MG: at 09:57

## 2021-08-18 RX ADMIN — ATORVASTATIN CALCIUM 20 MG: 20 TABLET, FILM COATED ORAL at 09:57

## 2021-08-18 RX ADMIN — NYSTATIN OINTMENT: 100000 OINTMENT TOPICAL at 10:11

## 2021-08-18 RX ADMIN — BACLOFEN 5 MG: 10 TABLET ORAL at 13:11

## 2021-08-18 RX ADMIN — Medication 10 ML: at 07:06

## 2021-08-18 RX ADMIN — Medication 100 MCG: at 09:57

## 2021-08-18 RX ADMIN — Medication: at 14:48

## 2021-08-18 NOTE — PROGRESS NOTES
TRANSITION OF CARE  RUR--14%  Disposition--Return to [facility] SNF. Danna ashby with [facility]. Patient's primary family contact: spouse January Rosales. CM follow up. CM spoke with Kika Ramirez SNF Liaison, who confirmed that patient is accepted and that bed is available today . Nurse Report to be called to [phone]. CM sent referral via [employer] to  [id number] [facility]) (Phone [phone]) for BLS transport, and referral was accepted  for a  4:00PM . CM completed PCS and placed it on chart. CM gave verbal update to staff nurse. Transition of Care Plan to SNF/Rehab    SNF/Rehab Transition:  Patient has been accepted to [facility] and meets criteria for admission. Patient will transported by [facility] and expected to leave at 4:00PM.    Communication to Patient/Family:  Hospitalist called daughter and gave update with CM present. Communication to SNF/Rehab:  Bedside RN has been notified to update the transition plan to the facility and call report (phone number [phone]. Discharge information has been updated on the AVS.       Nursing Please include all hard scripts for controlled substances, med rec and dc summary, and AVS in packet.      Reviewed and confirmed with facility can manage the patient care needs for the following:     SNF/Rehab Transition:  Reviewed and confirmed with facility, they can manage the patient care needs for the following:     Simon Mota with (X) only those applicable:    Medication:  [x]  Medications will be available at the facility  []  IV Antibiotics   []  Controlled Substance - hard copy to be sent with patient   []  Weekly Labs   Documents:  [x] Hard RX  [x] MAR  [x] Kardex  [x] AVS  []Transfer Summary  []Discharge   Equipment:  []  CPAP/BiPAP  []  Wound Vacuum  []  Pang or Urinary Device  []  PICC/Central Line  []  Nebulizer  []  Ventilator   Treatment:  []Isolation (for MRSA, VRE, etc.)  []Surgical Drain Management  []Tracheostomy Care  []Dressing Changes  []Dialysis with transportation and chair time . []PEG Care  []Oxygen  []Daily Weights for Heart Failure   Dietary:  []Any diet limitations  []Tube Feedings   []Total Parenteral Management (TPN)   Eligible for Medicaid Long Term Services and Supports  Yes:  [] Eligible for medical assistance or will become eligible within 180 days and UAI completed. [] Provider/Patient and/or support system has requested screening. [] UAI copy provided to patient or responsible party,.  [] UAI unavailable at discharge will send once processed to SNF provider. [] UAI unavailable at discharged mailed to patient  No:   [] Private pay and is not financially eligible for Medicaid within the next 180 days. [] Reside out-of-state.   [] A residents of a state owned/operated facility that is licensed  by [facility] or [facility]  [] Enrollment in [facility] hospice services  [] [address] [place] [address]  [x] Patient /Family declines to have screening completed or provide financial information for screening     Financial Resources:  Medicaid    [] Initiated and application pending   [] Full coverage     Advanced Care Plan:  []Surrogate Decision Maker of Care  []POA  []Communicated Code Status (DDNR\", \"Full\")    Other     Financial Resources:  Medicaid    [] Initiated and application pending   [] Full coverage     Advanced Care Plan:  []Surrogate Decision Maker of Care  []POA  []Communicated Code Status (DDNR\", \"Full\")    Other

## 2021-08-18 NOTE — DISCHARGE SUMMARY
Discharge Summary       PATIENT ID: Kyara Tucker  MRN: [id number]   YOB: 1950    DATE OF ADMISSION: 8/11/2021 10:42 AM    DATE OF DISCHARGE: 8/18/21 1:30   PRIMARY CARE PROVIDER: Kwaku Vasquez MD     ATTENDING PHYSICIAN: Grayson Dee  DISCHARGING PROVIDER: Pj Soni MD    To contact this individual call [phone] and ask the  to page. If unavailable ask to be transferred the Adult Hospitalist Department. CONSULTATIONS: IP CONSULT TO NEUROLOGY  IP CONSULT TO CARDIOLOGY    PROCEDURES/SURGERIES: * No surgery found *    ADMITTING DIAGNOSES & HOSPITAL COURSE:   This is a 71-year gentleman with past medical history of tetraplegia 2/2  spinal cord injury, C5 AIS B following GLF, after surgery for cervical stenosis on 4/15/21 at [facility]. 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 He recently finished course of antibiotics for UTI.     Patient patient is now brought for evaluation of confusion, hallucinations.  As per daughter, patient is oriented x3 at baseline, however since last 4 days he has been confused and for last 2 days he has been having visual and auditory hallucinations.  Daughter also reported decreased appetite.     DISCHARGE DIAGNOSES / PLAN:      Altered mental status (acute encephalopathy NOS) secondary to UTI and acute CVA- and suspected cognitive impairment, and medication sode effects  Waxing and waning symptoms but resolved over the past 2 days with med changes, IVFluids, and placement of rasheed     Enterobacter and citrobacter UTI- cultures showing resistance to ceftriaxone- changed to IV meropenem- received 3 day course  Rasheed catheter placed due to need for chronic rasheed      Quadriplegia due to previous high cervical spinal cord injury,   Autonomic dysreflexia, neurogenic bowel and bladder- OT/PT  Continue c-collar for now, has follow-up appointment with [facility] neurosurgery on 8/17- will need to be rescheduled     Acute CVA- neurology consulted and following  cont 81mg aspirin, cont statin- LDL at goal   ECHO w/ bubble study- was negative for PFO  Cont tele monitoring, carotid dopplers neg for critical stenosis  Cardiology to arrange 30 day cardiac monitor     , HLD, GERD- stable on current meds  Hypothyroidism- low normal free T4 and elevated TSh- increased the dose of levothyroxine from 125 to 150mcg/day      PT/OT/ Speech as tolerated     Full Code  : [place](Daughter) [phone]     ADDITIONAL CARE RECOMMENDATIONS:   You were admitted and diagnosed with the following medical issues/plan:  1) 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 Recommend use of rasheed catheter chronically- change rasheed every 4-6 weeks- current catheter placed 8/17/21  Continue cipro antibotics for 5 more days  Ensure adequate oral fluid intake    3)heart healthy diet  4) Wound care= Apply sacral foam border dressing to sacral/coccyx area for protection from friction/shearing and for pressure redistribution. Change every 3 days. Lift dressing every 8 hours to assess skin integrity and apply ordered Venelex ointment then reapply same dressing. Cleanse right posterior ankle wound with normal saline, apply Honey Sheet, cover with foam border dressing,  every other day and prn if becomes soiled.   Cleanse left posterior ankle with normal saline, apply Petroleum gauze, cover with foam border dressing, every other day and prn if becomes soiled <<<< Ensure heels are floated >>>>    5) cardiac event monitor should be returned to Dr Clement [address] [address]  [phone]    6) Call to reschedule follow up appt  with [facility] neurosurgery- regarding removal of cervical collar and cervical spine pain  7) oxygen as needed to keep SO2 > 90%, wean as tolerated, currently you are requiring 2L via nasal cannula   8) due to low normal free T4 and elevated TSH- we increased your thyroid med dose from 125mcg to 150  Continue higher dose and have labs rechecked in 1-2 months  9) suspected cognitive impairment with occasional sundowning- we stopped melatonin and decreased baclofen dose and mental status returned to normal.   10) Full Code     PENDING TEST RESULTS:   At the time of discharge the following test results are still pending:none    FOLLOW UP APPOINTMENTS:    Follow-up Information     Follow up With Specialties Details Why Contact Info    Adolfo Esquivel MD Cardiology  for results of cardiac holter monitoring [place] [address]  [address] [facility]      More Molina MD Family Medicine   [address]  [address]  [address]  [phone]           DIET: Cardiac Diet and Diabetic Diet  ACTIVITY: Activity as tolerated and PT/OT Eval and Treat  WOUND CARE: as above  EQUIPMENT needed: rasheed and cervical collar    DISCHARGE MEDICATIONS:    Cipro 500mg po BID x 5 days    Current Discharge Medication List      START taking these medications    Details   aspirin 81 mg chewable tablet Take 1 Tablet by mouth daily. Indications: stroke prevention  Qty: 30 Tablet, Refills: 4  Start date: 8/19/2021      balsam peru-castor oiL (VENELEX) ointment Apply  to affected area every eight (8) hours. Qty: 1 Tube, Refills: 0  Start date: 8/18/2021      polyethylene glycol (MIRALAX) 17 gram packet Take 1 Packet by mouth daily for 30 days. Qty: 30 Packet, Refills: 0  Start date: 8/18/2021, End date: 9/17/2021      oxyCODONE IR (ROXICODONE) 5 mg immediate release tablet Take 1 Tablet by mouth every six (6) hours as needed (Severe Pain) for up to 3 days.  Max Daily Amount: 20 mg.  Qty: 30 Tablet, Refills: 0  Start date: 8/18/2021, End date: 8/21/2021    Associated Diagnoses: Cervical spinal stenosis         CONTINUE these medications which have CHANGED    Details   baclofen 5 mg tab Take 5 mg by mouth four (4) times daily for 31 days. Qty: 120 Tablet, Refills: 0  Start date: 8/18/2021, End date: 9/18/2021      levothyroxine (SYNTHROID) 150 mcg tablet Take 1 Tablet by mouth Daily (before breakfast). Qty: 30 Tablet, Refills: 4  Start date: 8/19/2021         CONTINUE these medications which have NOT CHANGED    Details   acetaminophen (TYLENOL) 325 mg tablet Take  by mouth every four (4) hours as needed for Pain. bisacodyL (DULCOLAX) 10 mg supp Insert 10 mg into rectum daily. calcium carbonate (TUMS) 200 mg calcium (500 mg) chew Take 1 Tablet by mouth three (3) times daily. As needed      mineral oil-isopropyl myristat (EUCERIN) lotion Apply  to affected area as needed for Dry Skin. famotidine (PEPCID) 20 mg tablet Take 20 mg by mouth daily as needed for Heartburn. simethicone (MYLICON) 80 mg chewable tablet Take 80 mg by mouth every six (6) hours as needed for Flatulence. lidocaine (XYLOCAINE) 4 % topical cream Apply  to affected area daily. atorvastatin (Lipitor) 20 mg tablet Take 20 mg by mouth daily. loratadine (CLARITIN) 10 mg tablet Take 10 mg by mouth.      magnesium oxide (MAG-OX) 400 mg tablet Take 400 mg by mouth daily. melatonin 3 mg tablet Take  by mouth nightly. nitroglycerin (NITROBID) 2 % ointment Apply 1 Inch to affected area every fifteen (15) minutes as needed for Chest Pain (sbp about 150). nystatin (MYCOSTATIN) 100,000 unit/gram ointment Apply  to affected area two (2) times a day. omeprazole (PRILOSEC) 40 mg capsule Take 40 mg by mouth daily.       ondansetron (ZOFRAN ODT) 4 mg disintegrating tablet Take 4 mg by mouth every four (4) hours as needed for Nausea or Vomiting.      senna-docusate (Senokot-S) 8.6-50 mg per tablet Take  by mouth daily. For neurogenic bowel      cyanocobalamin (Vitamin B-12) 100 mcg tablet Take 100 mcg by mouth daily. metFORMIN (GLUCOPHAGE) 500 mg tablet Take 1 Tab by mouth two (2) times a day. albuterol (PROVENTIL VENTOLIN) 2.5 mg /3 mL (0.083 %) nebulizer solution 1.25 mg by Nebulization route every four (4) hours as needed. STOP taking these medications       DICLOFENAC SODIUM PO Comments:   Reason for Stopping:             NOTIFY YOUR PHYSICIAN FOR ANY OF THE FOLLOWING:   Fever over 101 degrees for 24 hours. Chest pain, shortness of breath, fever, chills, nausea, vomiting, diarrhea, change in mentation, falling, weakness, bleeding. Severe pain or pain not relieved by medications. Or, any other signs or symptoms that you may have questions about.     DISPOSITION:    Home With:   OT  PT  HH  RN      X Long term SNF/Inpatient Rehab    Independent/assisted living    Hospice    Other:       PATIENT CONDITION AT DISCHARGE:     Functional status    Poor    X Deconditioned     Independent      Cognition     Lucid    X Forgetful     Dementia      Catheters/lines (plus indication)   X Pang     PICC     PEG     Cervical Collar      Code status   X  Full code     DNR      PHYSICAL EXAMINATION AT DISCHARGE:  Patient Vitals for the past 24 hrs:   Temp Pulse Resp BP SpO2   08/18/21 1200  (!) 56      08/18/21 1000  61      08/18/21 0800  (!) 57      08/18/21 0748 98.2 °F (36.8 °C) 60 16 127/66 98 %   08/18/21 0647  (!) 55      08/18/21 0400  (!) 62      08/18/21 0307 98 °F (36.7 °C) 61 16 (!) 120/57 98 %   08/18/21 0200  (!) 58      08/18/21 0040 98.1 °F (36.7 °C) 62 14 125/68 99 %   08/18/21 0000  61      08/17/21 2200  63      08/17/21 2052 98 °F (36.7 °C) 62 16 (!) 95/52 97 %   08/17/21 2000  60      08/17/21 1800  63      08/17/21 1400  (!) 56      08/17/21 1352     99 %        Constitutional:  awake and alert- oriented- HEENT:  normocephalic, Oral mucosa moist, wearing cervical collar, . Resp:  CTA bilaterally. No wheezing/rhonchi/rales. No accessory muscle use   CV:  Regular rhythm, normal rate, no murmurs, gallops, rubs    GI:  Soft, non distended, non tender. normoactive bowel sounds, no hepatosplenomegaly     Musculoskeletal:  functional quadriplegia    Neurologic:  awake and alert, ? Slurring of speech, unable to move extremities but this is not new per patient                                  Data Review:    Review and/or order of clinical lab test  Review and/or order of tests in the radiology section of CPT  Review and/or order of tests in the medicine section of CPT              Results      Procedure Component Value Units Date/Time     URINE CULTURE HOLD SAMPLE [[id number]] Collected: 08/17/21 1217     Order Status: Completed Specimen: Serum Updated: 08/17/21 1242       Urine culture hold           Urine on hold in Microbiology dept for 2 days.   If unpreserved urine is submitted, it cannot be used for addtional testing after 24 hours, recollection will be required.             CULTURE, URINE [[id number]]  (Abnormal)  (Susceptibility) Collected: 08/11/21 5126     Order Status: Completed Specimen: Cath Urine Updated: 08/14/21 0913       Special Requests: NO SPECIAL REQUESTS           [place] Count --           [id number]  COLONIES/mL          Culture result: CITROBACTER YOUNGAE                       ENTEROBACTER CLOACAE COMPLEX             Susceptibility                   Citrobacter youngae Enterobacter cloacae complex          WENDI EWNDI          Amikacin ($) Susceptible Susceptible          Cefazolin ($) Resistant Resistant          Cefepime ($$) Susceptible Susceptible          Cefoxitin Resistant Resistant          Ceftazidime ($) Resistant Resistant          Ceftriaxone ($) Resistant Resistant          Ciprofloxacin ($) Susceptible Susceptible          Gentamicin ($) Susceptible Susceptible          Levofloxacin ($)   Susceptible          Meropenem ($$) Susceptible Susceptible          Nitrofurantoin Susceptible Intermediate          Piperacillin/Tazobac ($) Resistant Resistant          Tobramycin ($) Susceptible Susceptible          Trimeth/Sulfa   Susceptible                                   Linear View                        CULTURE, URINE [[id number]]       Order Status: Canceled Specimen: Cath Urine       CULTURE, BLOOD, PAIRED [[id number]] Collected: 08/11/21 1112     Order Status: Completed Specimen: Blood Updated: 08/16/21 0503       Special Requests: NO SPECIAL REQUESTS           Culture result: NO GROWTH 5 DAYS         URINE CULTURE HOLD SAMPLE [[id number]] Collected: 08/11/21 1112     Order Status: Completed Specimen: Serum Updated: 08/11/21 1134       Urine culture hold           Urine on hold in Microbiology dept for 2 days. If unpreserved urine is submitted, it cannot be used for addtional testing after 24 hours, recollection will be required.                  MRI Brain:  Multiple scattered acute cortical infarcts in the bilateral parietal lobes,  right larger than left. No evidence of acute hemorrhage.     The ventricles are normal in size and position. Scattered periventricular and  deep white matter T2/FLAIR hyperintensities, consistent with mild chronic  microvascular ischemic disease. There is no extra-axial fluid collection or mass  effect. There is no cerebellar tonsillar herniation. Expected arterial  flow-voids are present.     The paranasal sinuses, mastoid air cells, and middle ears are clear. The orbital  contents are within normal limits with bilateral lens implants. No significant  osseous or scalp lesions are identified.     MRA Head:  There is no evidence of large vessel occlusion or flow-limiting stenosis of the  intracranial internal carotid, anterior cerebral, and middle cerebral arteries.   The anterior communicating artery is patent.      There is no evidence of large vessel occlusion or flow-limiting stenosis of the  intracranial vertebral arteries, basilar artery, or posterior cerebral arteries. The posterior communicating arteries are patent, left larger than right.      There is no evidence of aneurysm or vascular malformation.     IMPRESSION  MRI Brain:  1. Multiple scattered acute cortical infarcts in the bilateral parietal lobes,  right larger than left. 2. Mild chronic microvascular ischemic disease.     MRA Head:  1. No evidence of significant stenosis or aneurysm.     Labs:          Recent Labs     08/17/21  0203   WBC 14.4*   HGB 11.6*   HCT 36.5*              Recent Labs     08/17/21  0203 08/15/21  0704    139   K 4.3 3.4*    105   CO2 30 31   BUN 44* 18   CREA 1.58* 0.45*   * 101*   CA 9.6 8.7      No results for input(s): ALT, AP, TBIL, TBILI, TP, ALB, GLOB, GGT, AML, LPSE in the last 72 hours.     No lab exists for component: SGOT, GPT, AMYP, HLPSE  No results for input(s): INR, PTP, APTT, INREXT, INREXT in the last 72 hours. No results for input(s): FE, TIBC, PSAT, FERR in the last 72 hours. No results found for: FOL, RBCF   No results for input(s): PH, PCO2, PO2 in the last 72 hours.   No results for input(s): CPK, CKNDX, TROIQ in the last 72 hours.     No lab exists for component: CPKMB        Lab Results   Component Value Date/Time     Cholesterol, total 127 08/13/2021 03:15 AM     HDL Cholesterol 50 08/13/2021 03:15 AM     LDL, calculated 47.4 08/13/2021 03:15 AM     Triglyceride 148 08/13/2021 03:15 AM     CHOL/HDL Ratio 2.5 08/13/2021 03:15 AM            Lab Results   Component Value Date/Time     Glucose (POC) 103 08/15/2021 04:31 AM     Glucose (POC) 95 08/14/2021 12:10 AM     Glucose (POC) 83 08/12/2021 06:38 AM     Glucose (POC) 82 08/11/2021 09:32 PM            Lab Results   Component Value Date/Time     Color YELLOW/STRAW 08/17/2021 12:17 PM     Appearance CLEAR 08/17/2021 12:17 PM     Specific gravity 1.016 08/17/2021 12:17 PM     pH (UA) 5.5 08/17/2021 12:17 PM     Protein 30 (A) 08/17/2021 12:17 PM     Glucose Negative 08/17/2021 12:17 PM     Ketone TRACE (A) 08/17/2021 12:17 PM     Bilirubin Negative 08/17/2021 12:17 PM     Urobilinogen 0.2 08/17/2021 12:17 PM     Nitrites Negative 08/17/2021 12:17 PM     Leukocyte Esterase Negative 08/17/2021 12:17 PM     Epithelial cells FEW 08/17/2021 12:17 PM     Bacteria Negative 08/17/2021 12:17 PM     WBC 0-4 08/17/2021 12:17 PM     RBC 0-5 08/17/2021 12:17 PM           CHRONIC MEDICAL DIAGNOSES:  Problem List as of 8/18/2021 Never Reviewed        Codes Class Noted - Resolved    Delirium ICD-10-CM: R41.0  ICD-9-CM: 780.09  8/14/2021 - Present        Cerebrovascular accident (CVA) due to bilateral embolism of posterior cerebral arteries ([facility] [address] ICD-10-CM: S55.869  ICD-9-CM: 434.11  8/14/2021 - Present        Altered mental status ICD-10-CM: R41.82  ICD-9-CM: 780.97  8/11/2021 - Present        CAD (coronary artery disease) ICD-10-CM: I25.10  ICD-9-CM: 414.00  6/9/2014 - Present        Chest pain ICD-10-CM: R07.9  ICD-9-CM: 786.50  5/23/2014 - Present        Hypotension ICD-10-CM: I95.9  ICD-9-CM: 458.9  6/25/2012 - Present        Insulin resistance ICD-10-CM: E88.81  ICD-9-CM: 277.7  6/25/2012 - Present        Other pulmonary embolism and infarction ICD-10-CM: I26.99  ICD-9-CM: 415.19  6/25/2012 - Present        Chronic venous embolism and thrombosis of unspecified deep vessels of lower extremity ICD-10-CM: I82.509  ICD-9-CM: 453.50  6/25/2012 - Present        Hypertensive heart disease ICD-10-CM: I11.9  ICD-9-CM: 402.90  6/25/2012 - Present        Dyslipidemia ICD-10-CM: E78.5  ICD-9-CM: 272.4  6/25/2012 - Present        Obesity ICD-10-CM: E66.9  ICD-9-CM: 278.00  6/25/2012 - Present        HTN (hypertension), malignant ICD-10-CM: I10  ICD-9-CM: 401.0  6/25/2012 - Present              Greater than 45 minutes were spent with the patient on counseling and coordination of care    Signed:   Kacy Gamez, MD  8/18/2021  1:34 PM   .

## 2021-08-18 NOTE — PROGRESS NOTES
[address] [facility] Adult  Hospitalist Group                                                                                          Hospitalist Progress Note  Yonas Beauchamp MD  Answering service: [phone] OR [phone] from in house phone     NAME:  Sugey Spicer  :  1950  MRN:  [id number]      Admission Summary: This is a 71-year gentleman with past medical history of tetraplegia 2/2  spinal cord injury, C5 AIS B following GLF, after surgery for cervical stenosis on 4/15/21 at [facility]. Patient now has autonomic dysreflexia, neurogenic bowel and bladder and has been bedbound, currently [facility]. He recently finished course of antibiotics for UTI.     Patient patient is now brought for evaluation of confusion, hallucinations. As per daughter, patient is oriented x3 at baseline, however since last 4 days he has been confused and for last 2 days he has been having visual and auditory hallucinations. Daughter also reported decreased appetite. Interval history / Subjective:   Patient lethargic but confusion and hallucinations appear to have resolved  Brain MRI showing acute CVA-   Consulted cardiology for holter monitor     Assessment & Plan:      Altered mental status secondary to UTI and acute CVA-   Waxing and waning symptoms    UTI- cultures showing resistance to ceftriaxone- changed to IV meropenem  Patient is on scheduled straight catheterization every 6 hours     Quadriplegia due to previous high cervical spinal cord injury,   Autonomic dysreflexia, neurogenic bowel and bladder- OT/PT  Continue c-collar for now, has follow-up appointment with [facility] neurosurgery on     Acute CVA- neurology consulted and following  cont 81mg aspirin, cont statin- LDL at goal   ECHO w/ bubble study- was negative for PFO  Cont tele monitoring, carotid dopplers neg for critical stenosis    Hypothyroidism, HLD, GERD- stable on current meds      PT/OT    Full Code  : [place](Daughter) [phone]    Code status: FULL  DVT prophylaxis: lovenox    Care Plan discussed with: Patient/Family  Anticipated Disposition: SNF/LTC  Anticipated Discharge: Greater than 48 hours     Hospital Problems  Never Reviewed        Codes Class Noted POA    Delirium ICD-10-CM: R41.0  ICD-9-CM: 780.09  8/14/2021 Unknown        Cerebrovascular accident (CVA) due to bilateral embolism of posterior cerebral arteries [facility]) ICD-10-CM: J42.426  ICD-9-CM: 434.11  8/14/2021 Unknown                Review of Systems:   A comprehensive review of systems was negative except for that written in the HPI. Vital Signs:    Last 24hrs VS reviewed since prior progress note. Most recent are:  Visit Vitals  BP (!) 95/52   Pulse 63   Temp 98 °F (36.7 °C)   Resp 16   Ht 6' 1\" (1.854 m)   Wt 113.4 kg (250 lb)   SpO2 97%   BMI 32.98 kg/m²     Patient Vitals for the past 24 hrs:   Temp Pulse Resp BP SpO2   08/17/21 2200  63      08/17/21 2052 98 °F (36.7 °C) 62 16 (!) 95/52 97 %   08/17/21 2000  60      08/17/21 1800  63      08/17/21 1400  (!) 56      08/17/21 1352     99 %   08/17/21 1302 98.4 °F (36.9 °C) (!) 53 18 (!) 110/59 97 %   08/17/21 1200  (!) 57      08/17/21 1000  (!) 59      08/17/21 0818 99.1 °F (37.3 °C) 83 18 (!) 148/63 99 %   08/17/21 0605  61      08/17/21 0404  65      08/17/21 0354 97.6 °F (36.4 °C) 66 18 103/69 93 %   08/17/21 0205  70  (!) 108/58          Intake/Output Summary (Last 24 hours) at 8/18/2021 0002  Last data filed at 8/17/2021 1322  Gross per 24 hour   Intake    Output 672 ml   Net -672 ml        Physical Examination:           Constitutional:  confused with hallucinations today   HEENT:  normocephalic, Oral mucosa moist, wearing cervical collar, . Resp:  CTA bilaterally. No wheezing/rhonchi/rales. No accessory muscle use   CV:  Regular rhythm, normal rate, no murmurs, gallops, rubs    GI:  Soft, non distended, non tender.  normoactive bowel sounds, no hepatosplenomegaly     Musculoskeletal:  functional quadriplegia    Neurologic:  awake and alert, ? Slurring of speech, unable to move extremities but this is not new per patient            Data Review:    Review and/or order of clinical lab test  Review and/or order of tests in the radiology section of CPT  Review and/or order of tests in the medicine section of CPT    Results     Procedure Component Value Units Date/Time    URINE CULTURE HOLD SAMPLE [[id number]] Collected: 08/17/21 1217    Order Status: Completed Specimen: Serum Updated: 08/17/21 1242     Urine culture hold       Urine on hold in Microbiology dept for 2 days. If unpreserved urine is submitted, it cannot be used for addtional testing after 24 hours, recollection will be required.           CULTURE, URINE [[id number]]  (Abnormal)  (Susceptibility) Collected: 08/11/21 1743    Order Status: Completed Specimen: Cath Urine Updated: 08/14/21 0916     Special Requests: NO SPECIAL REQUESTS        Morgan Count --        [id number]  COLONIES/mL       Culture result: CITROBACTER YOUNGAE               ENTEROBACTER CLOACAE COMPLEX          Susceptibility      Citrobacter youngae Enterobacter cloacae complex      WENDI WENDI      Amikacin ($) Susceptible Susceptible      Cefazolin ($) Resistant Resistant      Cefepime ($$) Susceptible Susceptible      Cefoxitin Resistant Resistant      Ceftazidime ($) Resistant Resistant      Ceftriaxone ($) Resistant Resistant      Ciprofloxacin ($) Susceptible Susceptible      Gentamicin ($) Susceptible Susceptible      Levofloxacin ($)  Susceptible      Meropenem ($$) Susceptible Susceptible      Nitrofurantoin Susceptible Intermediate      Piperacillin/Tazobac ($) Resistant Resistant      Tobramycin ($) Susceptible Susceptible      Trimeth/Sulfa  Susceptible                 Linear View                   CULTURE, URINE [[id number]]     Order Status: Canceled Specimen: Cath Urine     CULTURE, BLOOD, PAIRED [[id number]] Collected: 08/11/21 1112    Order Status: Completed Specimen: Blood Updated: 08/16/21 0503     Special Requests: NO SPECIAL REQUESTS        Culture result: NO GROWTH 5 DAYS       URINE CULTURE HOLD SAMPLE [[id number]] Collected: 08/11/21 1112    Order Status: Completed Specimen: Serum Updated: 08/11/21 1134     Urine culture hold       Urine on hold in Microbiology dept for 2 days. If unpreserved urine is submitted, it cannot be used for addtional testing after 24 hours, recollection will be required. MRI Brain:  Multiple scattered acute cortical infarcts in the bilateral parietal lobes,  right larger than left. No evidence of acute hemorrhage.     The ventricles are normal in size and position. Scattered periventricular and  deep white matter T2/FLAIR hyperintensities, consistent with mild chronic  microvascular ischemic disease. There is no extra-axial fluid collection or mass  effect. There is no cerebellar tonsillar herniation. Expected arterial  flow-voids are present.     The paranasal sinuses, mastoid air cells, and middle ears are clear. The orbital  contents are within normal limits with bilateral lens implants. No significant  osseous or scalp lesions are identified.     MRA Head:  There is no evidence of large vessel occlusion or flow-limiting stenosis of the  intracranial internal carotid, anterior cerebral, and middle cerebral arteries. The anterior communicating artery is patent.      There is no evidence of large vessel occlusion or flow-limiting stenosis of the  intracranial vertebral arteries, basilar artery, or posterior cerebral arteries. The posterior communicating arteries are patent, left larger than right.      There is no evidence of aneurysm or vascular malformation.     IMPRESSION  MRI Brain:  1. Multiple scattered acute cortical infarcts in the bilateral parietal lobes,  right larger than left. 2. Mild chronic microvascular ischemic disease.     MRA Head:  1.  No evidence of significant stenosis or aneurysm. Labs:     Recent Labs     08/17/21  0203   WBC 14.4*   HGB 11.6*   HCT 36.5*        Recent Labs     08/17/21  0203 08/15/21  0704    139   K 4.3 3.4*    105   CO2 30 31   BUN 44* 18   CREA 1.58* 0.45*   * 101*   CA 9.6 8.7     No results for input(s): ALT, AP, TBIL, TBILI, TP, ALB, GLOB, GGT, AML, LPSE in the last 72 hours. No lab exists for component: SGOT, GPT, AMYP, HLPSE  No results for input(s): INR, PTP, APTT, INREXT, INREXT in the last 72 hours. No results for input(s): FE, TIBC, PSAT, FERR in the last 72 hours. No results found for: FOL, RBCF   No results for input(s): PH, PCO2, PO2 in the last 72 hours. No results for input(s): CPK, CKNDX, TROIQ in the last 72 hours.     No lab exists for component: CPKMB  Lab Results   Component Value Date/Time    Cholesterol, total 127 08/13/2021 03:15 AM    HDL Cholesterol 50 08/13/2021 03:15 AM    LDL, calculated 47.4 08/13/2021 03:15 AM    Triglyceride 148 08/13/2021 03:15 AM    CHOL/HDL Ratio 2.5 08/13/2021 03:15 AM     Lab Results   Component Value Date/Time    Glucose (POC) 103 08/15/2021 04:31 AM    Glucose (POC) 95 08/14/2021 12:10 AM    Glucose (POC) 83 08/12/2021 06:38 AM    Glucose (POC) 82 08/11/2021 09:32 PM     Lab Results   Component Value Date/Time    Color YELLOW/STRAW 08/17/2021 12:17 PM    Appearance CLEAR 08/17/2021 12:17 PM    Specific gravity 1.016 08/17/2021 12:17 PM    pH (UA) 5.5 08/17/2021 12:17 PM    Protein 30 (A) 08/17/2021 12:17 PM    Glucose Negative 08/17/2021 12:17 PM    Ketone TRACE (A) 08/17/2021 12:17 PM    Bilirubin Negative 08/17/2021 12:17 PM    Urobilinogen 0.2 08/17/2021 12:17 PM    Nitrites Negative 08/17/2021 12:17 PM    Leukocyte Esterase Negative 08/17/2021 12:17 PM    Epithelial cells FEW 08/17/2021 12:17 PM    Bacteria Negative 08/17/2021 12:17 PM    WBC 0-4 08/17/2021 12:17 PM    RBC 0-5 08/17/2021 12:17 PM         Medications Reviewed:     Current Facility-Administered Medications   Medication Dose Route Frequency    dextrose 5 % - 0.45% NaCl infusion  75 mL/hr IntraVENous CONTINUOUS    oxyCODONE IR (ROXICODONE) tablet 5 mg  5 mg Oral Q4H PRN    baclofen (LIORESAL) tablet 5 mg  5 mg Oral QID    nitroglycerin (NITROSTAT) tablet 0.4 mg  0.4 mg SubLINGual PRN    balsam peru-castor oiL (VENELEX) ointment   Topical Q8H    aspirin chewable tablet 81 mg  81 mg Oral DAILY    sodium chloride (NS) flush 5-40 mL  5-40 mL IntraVENous Q8H    sodium chloride (NS) flush 5-40 mL  5-40 mL IntraVENous PRN    acetaminophen (TYLENOL) tablet 650 mg  650 mg Oral Q6H PRN    Or    acetaminophen (TYLENOL) suppository 650 mg  650 mg Rectal Q6H PRN    polyethylene glycol (MIRALAX) packet 17 g  17 g Oral DAILY PRN    ondansetron (ZOFRAN) injection 4 mg  4 mg IntraVENous Q6H PRN    enoxaparin (LOVENOX) injection 40 mg  40 mg SubCUTAneous DAILY    albuterol (PROVENTIL VENTOLIN) nebulizer solution 1.25 mg  1.25 mg Nebulization Q6H PRN    atorvastatin (LIPITOR) tablet 20 mg  20 mg Oral DAILY    bisacodyL (DULCOLAX) suppository 10 mg  10 mg Rectal DAILY    calcium carbonate (TUMS) chewable tablet 200 mg [elemental]  200 mg Oral TID    cyanocobalamin (VITAMIN B12) tablet 100 mcg  100 mcg Oral DAILY    levothyroxine (SYNTHROID) tablet 125 mcg  125 mcg Oral ACB    lidocaine (XYLOCAINE) 4 % cream   Topical DAILY    cetirizine (ZYRTEC) tablet 10 mg  10 mg Oral DAILY    magnesium oxide (MAG-OX) tablet 400 mg  400 mg Oral DAILY    [Held by provider] melatonin tablet 3 mg  3 mg Oral QHS    ondansetron (ZOFRAN ODT) tablet 4 mg  4 mg Oral Q4H PRN    pantoprazole (PROTONIX) tablet 40 mg  40 mg Oral ACB&D    nitroglycerin (NITROBID) 2 % ointment 1 Inch  1 Inch Topical Q15MIN PRN    nystatin (MYCOSTATIN) 100,000 unit/gram ointment   Topical BID    senna-docusate (PERICOLACE) 8.6-50 mg per tablet 2 Tablet  2 Tablet Oral DAILY ______________________________________________________________________  EXPECTED LENGTH OF STAY: 3d 19h  ACTUAL LENGTH OF STAY:          7                 Sandeep Wilkins MD

## 2021-08-18 NOTE — PROGRESS NOTES
Bedside shift change report given to Jose Roberto Dodson RN (oncoming nurse) by Aliya Martin RN (offgoing nurse). Report included the following information SBAR, Kardex, Intake/Output, MAR and Recent Results.

## 2021-08-18 NOTE — DISCHARGE INSTRUCTIONS
You were admitted and diagnosed with the following medical issues/plan:  1) 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 Recommend use of rasheed catheter chronically- change rasheed every 4-6 weeks- current catheter placed 8/17/21  Continue cipro antibotics for 5 more days  Ensure adequate oral fluid intake    3)heart healthy diet  4) Wound care= Apply sacral foam border dressing to sacral/coccyx area for protection from friction/shearing and for pressure redistribution. Change every 3 days. Lift dressing every 8 hours to assess skin integrity and apply ordered Venelex ointment then reapply same dressing. Cleanse right posterior ankle wound with normal saline, apply Honey Sheet, cover with foam border dressing,  every other day and prn if becomes soiled.   Cleanse left posterior ankle with normal saline, apply Petroleum gauze, cover with foam border dressing, every other day and prn if becomes soiled <<<< Ensure heels are floated >>>>    5) cardiac event monitor should be returned to Dr Yany Yang [address] [facility] [phone]    6) Call to reschedule follow up appt  with [facility] neurosurgery- regarding removal of cervical collar and cervical spine pain  7) oxygen as needed to keep SO2 > 90%, wean as tolerated, currently you are requiring 2L via nasal cannula   8) due to low normal free T4 and elevated TSH- we increased your thyroid med dose from 125mcg to 150  Continue higher dose and have labs rechecked in 1-2 months  9) suspected cognitive impairment with occasional sundowning- we stopped melatonin and decreased baclofen dose and mental status returned to normal.   10) Full Code

## 2021-08-19 ENCOUNTER — PATIENT OUTREACH (OUTPATIENT)
Dept: CASE MANAGEMENT | Age: 71
End: 2021-08-19

## 2021-08-19 ENCOUNTER — TELEPHONE (OUTPATIENT)
Dept: CARDIOLOGY CLINIC | Age: 71
End: 2021-08-19

## 2021-08-19 NOTE — TELEPHONE ENCOUNTER
Enrolled with [employer] - Ordered and being shipped to the address provided. ETA within 5-7 business days.

## 2021-08-19 NOTE — PROGRESS NOTES
Transition of care outreach postponed for 14 days due to patient's discharge to SNF.   [facility] 8/11-8/18/21 delirium d/c jose f/u 14d

## 2021-08-19 NOTE — TELEPHONE ENCOUNTER
----- Message from Melinda Barber RN sent at 8/19/2021 10:30 AM EDT -----  Regarding: safemail  Dr. Leidy James would like patient to have 30 day [employer] event monitor mailed to [facility]. The Center will return the monitor put on at the hospital. Dx acute CVA. Monitor to be mailed to:     [address] [place] [address] Road: Roxanne/Mr. John Virgen  [address] [place] [address], [address] [place] [address] Ave    Thanks!   Ren Lipoma

## 2021-08-20 LAB — MAGNESIUM RBC-SCNC: 5.1 MG/DL (ref 4.2–6.8)

## 2021-08-26 ENCOUNTER — PATIENT OUTREACH (OUTPATIENT)
Dept: CASE MANAGEMENT | Age: 71
End: 2021-08-26

## 2021-09-01 ENCOUNTER — PATIENT OUTREACH (OUTPATIENT)
Dept: CASE MANAGEMENT | Age: 71
End: 2021-09-01

## 2021-09-01 NOTE — PROGRESS NOTES
Post Acute Facility Update     *The information contained in this note was received during a weekly care coordination call with the post acute facility*    Current Facility: [address] [place] [address] Kimmyar, Celestino Kulwant (SNF)  Anticipated Discharge Date: 9/9/2021    Facility Nursing Update:  BP has been stable. Cardiology arranged 30-day cardiac monitor. Restarted oxycodone for pain management. Facility Social Work Update: DC home on 9/9. Family will provide 24/7 care. Family is installing a ramp, hancicap-accessible bathroom, and a call bell system in the home. [facility]. Set up with a new PCP for home-visits. Bundle Program Status: none  Upper Extremity Assistance: Dependent  Lower Extremity Assistance: Dependent  Bed Mobility: Dependent  Transfers: Dependent  Ambulation: Dependent  How far (in feet) is the patient ambulating?  Not walking   Device Used: None   Barriers to Discharge: MICHELE Moreland MSW  [facility]

## 2021-09-01 NOTE — PROGRESS NOTES
Post Acute Facility Update     *The information contained in this note was received during a weekly care coordination call with the post acute facility*    Current Facility: [address] [place] [address] [facility]  Anticipated Discharge Date: TBD    Facility Nursing Update: no current updates  Facility Social Work Update: No discharge date. Resident's goal is to return home with help. If that is not safe, he will likely stay LTC. Family meeting scheduled for 8/26 to continue discharge planning.   Bundle Program Status: none  Upper Extremity Assistance: Dependent  Lower Extremity Assistance: Dependent  Bed Mobility: Dependent  Transfers: Dependent  Ambulation: Dependent  How far (in feet) is the patient ambulating? none  Device Used: N/A  Barriers to Discharge: MICHELE AVILEZN, RN  [employer]

## 2021-09-07 ENCOUNTER — TELEPHONE (OUTPATIENT)
Dept: CARDIOLOGY CLINIC | Age: 71
End: 2021-09-07

## 2021-09-07 NOTE — TELEPHONE ENCOUNTER
Received fax notification from [employer] that they have been unable to hook up the patient's monitor after several attempts.

## 2021-09-08 ENCOUNTER — PATIENT OUTREACH (OUTPATIENT)
Dept: CASE MANAGEMENT | Age: 71
End: 2021-09-08

## 2021-09-08 NOTE — PROGRESS NOTES
[address] [place] [address] Dr Discharge Note    *The information contained in this note was received during a weekly care coordination call with the post acute facility*      Patient was discharged from [address] [place] [address] ([facility]) on 9/8/2021. PCP: MD BAUTISTA Vasquez appointment: [address] on 9/14/21  Future Appointments   Date Time Provider Yariel Singhi   9/29/2021  3:00 PM Valjean Schwab, MD [facility]       Home Health/Outpatient orders at discharge: home health care  [address]: [address] ordered at discharge: [address] [employer] [place] with air mattress, Angelito lift delivered to patients home,  Family installed a ramp, call bells and are renovating bathroom to make it handicap-accessible  [address]: did not advise  Durable Medical Equipment received prior to discharge: yes    LPN Care Coordinator managing patient: [facility] Team will sign off at this time. Medications were not reconciled and general patient assessment was not completed during this skilled nursing facility outreach.      Fina AVILEZN, RN  [employer]

## 2021-09-10 ENCOUNTER — PATIENT OUTREACH (OUTPATIENT)
Dept: CASE MANAGEMENT | Age: 71
End: 2021-09-10

## 2021-09-10 NOTE — PROGRESS NOTES
[address] [place] [address] Dr Discharge Follow-Up      Date/Time:  9/10/2021 9:54 AM    Patient was admitted to [facility] on 21 and discharged to [address] [place] [address] on 21. The patients discharge diagnosis was delirium. Patient was discharge on 21 from [address] [place] [address]. The discharge summary from the post acute facilty was not available at the time of outreach. Patient was contacted within 2 business days of discharge from the post acute facility. LPN Care Coordinator contacted the family  Son Noel Armendariz patient is not verbal by telephone to perform post hospital discharge follow up. Verified name and  with family as identifiers. Provided introduction to self, and explanation of the LPN Care Coordinator role. Family received post acute facility discharge instructions. LPN Care Coordinator reviewed discharge instructions and red flags with family who verbalized understanding. Family given an opportunity to ask questions and does not have any further questions or concerns at this time. The family agrees to contact the PCP office for questions related to their healthcare. N Care Coordinator provided contact information for future reference. Advance Care Planning:   Does patient have an Advance Directive:  not on file     [address] orders at discharge: PT, OT, [address]: [employer]  Date of initial visit: 21    Durable Medical Equipment ordered at discharge: ramp and sindy lift, hospital bed  [address] [facility] [address]: unsure  Durable Medical Equipment received: 21    Medication(s):   The post acute facility medication discharge list was not available for this call.   Advised to take D/C list to PCP follow up     [facility] follow up appointment(s):   Future Appointments   Date Time Provider Yariel Mckinley   2021  3:00 PM Lynita Leventhal, MD [facility]      Non-[facility] follow up appointment(s): PCP in home visit 21  [facility]: information provided as a resource

## 2021-09-23 ENCOUNTER — TELEPHONE (OUTPATIENT)
Dept: CARDIOLOGY CLINIC | Age: 71
End: 2021-09-23

## 2021-09-23 NOTE — TELEPHONE ENCOUNTER
----- Message from Marissa Thomas MD sent at 9/23/2021  7:57 AM EDT -----  Osman Mcguire news, no afib. Chronic bradycardia(slow heartrate)  Frequent PAC/PVC      Tried calling patient, no answer. LVM for patient to return call at earliest convenience.  9/28/21 @ 3:24 pm

## 2021-10-01 ENCOUNTER — PATIENT OUTREACH (OUTPATIENT)
Dept: CASE MANAGEMENT | Age: 71
End: 2021-10-01

## 2021-10-15 ENCOUNTER — TELEPHONE (OUTPATIENT)
Dept: CARDIOLOGY CLINIC | Age: 71
End: 2021-10-15

## 2021-10-15 NOTE — TELEPHONE ENCOUNTER
Mr. Brooke Hartman got her loop monitor delivered but never hook it up. Several attempts were made to contact the patient by Damian with no success.      Cancellation of monitor services  have been requested and Damian will begin the monitor recovery process at this time. .. Your shipment  [id number]     Delivered On  Tuesday, August 31 at 12:07 P. M. at [facility]

## 2022-03-18 PROBLEM — I63.433 CEREBROVASCULAR ACCIDENT (CVA) DUE TO BILATERAL EMBOLISM OF POSTERIOR CEREBRAL ARTERIES (HCC): Status: ACTIVE | Noted: 2021-08-14

## 2022-03-19 PROBLEM — R41.0 DELIRIUM: Status: ACTIVE | Noted: 2021-08-14

## 2022-03-19 PROBLEM — R41.82 ALTERED MENTAL STATUS: Status: ACTIVE | Noted: 2021-08-11

## 2023-05-31 NOTE — ED PROVIDER NOTES
Date of Service:  8/11/2021    Patient:  Rosita Pang    Chief Complaint:  Altered mental status       HPI:  Rosita Pang is a 80 y.o.  male who presents for evaluation of mental status change. Per EMS, patient was found to have altered mental status in the presence of a known UTI and was sent here to the emergency department for evaluation. Patient arrives here awake alert oriented to person place and time, apparently unaware that he is quadriplegic. Patient answers questions appropriately and follows commands. Here, patient denies any acute complaints other than stating he just does not feel well. Past Medical History:   Diagnosis Date    CAD (coronary artery disease)     Essential hypertension     Hyperlipidemia     Stroke [facility])        History reviewed. No pertinent surgical history. History reviewed. No pertinent family history. Social History     Socioeconomic History    Marital status:      Spouse name: Not on file    Number of children: Not on file    Years of education: Not on file    Highest education level: Not on file   Occupational History    Not on file   Tobacco Use    Smoking status: Former Smoker     Packs/day: 1.50     Years: 5.00     Pack years: 7.50     Types: Cigarettes    Smokeless tobacco: Never Used   Substance and Sexual Activity    Alcohol use: Not Currently     Alcohol/week: 0.0 standard drinks     Comment: rare    Drug use: No    Sexual activity: Not on file   Other Topics Concern    Not on file   Social History Narrative    Not on file     Social Determinants of Health     Financial Resource Strain:     Difficulty of Paying Living Expenses:    Food Insecurity:     Worried About Running Out of Food in the Last Year:     Ran Out of Food in the Last Year:    Transportation Needs:     Lack of Transportation (Medical):      Lack of Transportation (Non-Medical):    Physical Activity:     Days of Exercise per Week:     Minutes of Exercise per Session:    Stress:     Feeling of Stress :    Social Connections:     Frequency of Communication with Friends and Family:     Frequency of Social Gatherings with Friends and Family:     Attends Mandaeism Services:     Active Member of Clubs or Organizations:     Attends Club or Organization Meetings:     Marital Status:    Intimate Partner Violence:     Fear of Current or Ex-Partner:     Emotionally Abused:     Physically Abused:     Sexually Abused: ALLERGIES: Mavik [trandolapril] and Sulfa dyne    Review of Systems   Constitutional: Negative for fever. HENT: Negative for hearing loss. Eyes: Negative for visual disturbance. Respiratory: Negative for shortness of breath. Cardiovascular: Negative for chest pain. Gastrointestinal: Negative for abdominal pain. Genitourinary: Negative for flank pain. Musculoskeletal: Negative for back pain. Skin: Negative for rash. Neurological: Negative for dizziness and light-headedness. Psychiatric/Behavioral: Positive for confusion. Vitals:    08/11/21 1050   BP: (!) 117/57   Pulse: 71   Resp: 22   Temp: 98.5 °F (36.9 °C)   SpO2: (!) 86%            Physical Exam  Vitals and nursing note reviewed. Constitutional:       Appearance: He is not ill-appearing. HENT:      Head: Normocephalic and atraumatic. Mouth/Throat:      Mouth: Mucous membranes are moist.   Eyes:      Comments: Right pupil 3mm, left 1mm (notes to be chronic per patient)   Neck:      Comments: In ccollar  Cardiovascular:      Rate and Rhythm: Normal rate. Heart sounds: Normal heart sounds. Pulmonary:      Effort: Pulmonary effort is normal. No respiratory distress. Abdominal:      Palpations: Abdomen is soft. Musculoskeletal:         General: No tenderness. Skin:     General: Skin is warm. Capillary Refill: Capillary refill takes less than 2 seconds.    Neurological:      Mental Status: He is alert and oriented to person, place, and time.   Psychiatric:         Mood and Affect: Mood normal.          [facility]  ED Course as of Aug 11 1403   Wed Aug 11, 2021   1117 EKG 1109  Normal sinus rhythm at 67 bpm with a normal axis. Normal intervals.   No STEMI    [GG]   1239 Leukocyte Esterase(!): MODERATE [GG]   1239 Nitrites(!): Positive [GG]      ED Course User Index  [GG] Addy Kehr,      VITAL SIGNS:  Patient Vitals for the past 4 hrs:   Temp Pulse Resp BP SpO2   08/11/21 1300  63 16 (!) 90/54    08/11/21 1230  66 16 (!) 147/67 99 %   08/11/21 1152 97.9 °F (36.6 °C) 67 21 (!) 98/57 91 %   08/11/21 1145  67 18 126/60 93 %   08/11/21 1130  67 20 116/61 (!) 89 %   08/11/21 1116  66 20 114/62 92 %   08/11/21 1050 98.5 °F (36.9 °C) 71 22 (!) 117/57 (!) 86 %         LABS:  Recent Results (from the past 6 hour(s))   DRUG SCREEN, URINE    Collection Time: 08/11/21 11:02 AM   Result Value Ref Range    AMPHETAMINES Negative NEG      BARBITURATES Negative NEG      BENZODIAZEPINES Negative NEG      COCAINE Negative NEG      METHADONE Negative NEG      OPIATES Negative NEG      PCP(PHENCYCLIDINE) Negative NEG      THC (TH-CANNABINOL) Negative NEG      Drug screen comment (NOTE)    URINALYSIS W/MICROSCOPIC    Collection Time: 08/11/21 11:02 AM   Result Value Ref Range    Color DARK YELLOW      Appearance CLOUDY (A) CLEAR      Specific gravity 1.024 1.003 - 1.030      pH (UA) 5.0 5.0 - 8.0      Protein 100 (A) NEG mg/dL    Glucose Negative NEG mg/dL    Ketone Negative NEG mg/dL    Blood LARGE (A) NEG      Urobilinogen 0.2 0.2 - 1.0 EU/dL    Nitrites Positive (A) NEG      Leukocyte Esterase MODERATE (A) NEG      WBC >100 (H) 0 - 4 /hpf    RBC 5-10 0 - 5 /hpf    Epithelial cells FEW FEW /lpf    Bacteria 1+ (A) NEG /hpf    Mucus TRACE (A) NEG /lpf   BILIRUBIN, CONFIRM    Collection Time: 08/11/21 11:02 AM   Result Value Ref Range    Bilirubin UA, confirm Negative NEG     SAMPLES BEING HELD    Collection Time: 08/11/21 11:03 AM   Result Value Ref Range SAMPLES BEING HELD 1blu,1red     COMMENT        Add-on orders for these samples will be processed based on acceptable specimen integrity and analyte stability, which may vary by analyte. EKG, 12 LEAD, INITIAL    Collection Time: 08/11/21 11:09 AM   Result Value Ref Range    Ventricular Rate 67 BPM    Atrial Rate 67 BPM    P-R Interval 164 ms    QRS Duration 102 ms    Q-T Interval 446 ms    QTC Calculation (Bezet) 471 ms    Calculated P Axis 43 degrees    Calculated R Axis 23 degrees    Calculated T Axis 71 degrees    Diagnosis       Normal sinus rhythm  Inferior infarct , age undetermined  Abnormal ECG  When compared with ECG of 09-JUN-2014 10:44,  Inferior infarct is now present  Non-specific change in ST segment in Inferior leads  ST now depressed in Lateral leads  T wave inversion no longer evident in Inferior leads     CBC WITH AUTOMATED DIFF    Collection Time: 08/11/21 11:12 AM   Result Value Ref Range    WBC 10.6 4.1 - 11.1 K/uL    RBC 3.93 (L) 4.10 - 5.70 M/uL    HGB 11.9 (L) 12.1 - 17.0 g/dL    HCT 38.0 36.6 - 50.3 %    MCV 96.7 80.0 - 99.0 FL    MCH 30.3 26.0 - 34.0 PG    MCHC 31.3 30.0 - 36.5 g/dL    RDW 16.0 (H) 11.5 - 14.5 %    PLATELET 584 764 - 284 K/uL    MPV 9.9 8.9 - 12.9 FL    NRBC 0.0 0  WBC    ABSOLUTE NRBC 0.00 0.00 - 0.01 K/uL    NEUTROPHILS 64 32 - 75 %    LYMPHOCYTES 25 12 - 49 %    MONOCYTES 10 5 - 13 %    EOSINOPHILS 0 0 - 7 %    BASOPHILS 0 0 - 1 %    IMMATURE GRANULOCYTES 1 (H) 0.0 - 0.5 %    ABS. NEUTROPHILS 6.8 1.8 - 8.0 K/UL    ABS. LYMPHOCYTES 2.6 0.8 - 3.5 K/UL    ABS. MONOCYTES 1.1 (H) 0.0 - 1.0 K/UL    ABS. EOSINOPHILS 0.0 0.0 - 0.4 K/UL    ABS. BASOPHILS 0.0 0.0 - 0.1 K/UL    ABS. IMM.  GRANS. 0.1 (H) 0.00 - 0.04 K/UL    DF AUTOMATED     METABOLIC PANEL, COMPREHENSIVE    Collection Time: 08/11/21 11:12 AM   Result Value Ref Range    Sodium 137 136 - 145 mmol/L    Potassium 3.8 3.5 - 5.1 mmol/L    Chloride PENDING mmol/L    CO2 32 21 - 32 mmol/L    Anion gap PENDING mmol/L Glucose PENDING mg/dL    BUN 31 (H) 6 - 20 MG/DL    Creatinine 0.80 0.70 - 1.30 MG/DL    BUN/Creatinine ratio 39 (H) 12 - 20      GFR est AA >60 >60 ml/min/1.73m2    GFR est non-AA >60 >60 ml/min/1.73m2    Calcium PENDING MG/DL    Bilirubin, total 0.6 0.2 - 1.0 MG/DL    ALT (SGPT) 12 12 - 78 U/L    AST (SGOT) PENDING U/L    Alk. phosphatase 86 45 - 117 U/L    Protein, total 6.8 6.4 - 8.2 g/dL    Albumin 2.5 (L) 3.5 - 5.0 g/dL    Globulin 4.3 (H) 2.0 - 4.0 g/dL    A-G Ratio 0.6 (L) 1.1 - 2.2     LACTIC ACID    Collection Time: 08/11/21 11:12 AM   Result Value Ref Range    Lactic acid 2.0 0.4 - 2.0 MMOL/L   URINE CULTURE HOLD SAMPLE    Collection Time: 08/11/21 11:12 AM    Specimen: Serum   Result Value Ref Range    Urine culture hold        Urine on hold in Microbiology dept for 2 days. If unpreserved urine is submitted, it cannot be used for addtional testing after 24 hours, recollection will be required. IMAGING:  XR CHEST PORT   Final Result   No Acute Disease. Medications During Visit:  Medications   cefTRIAXone (ROCEPHIN) 1 g in 0.9% sodium chloride (MBP/ADV) 50 mL MBP (has no administration in time range)         DECISION MAKING:  Gomez Baird is a 80 y.o. male who comes in as above. Here, patient is awake and alert answers most questions appropriately. He is unaware that he has paralysis according to his daughter at bedside, he is at his normal mental status. He has had some recent hallucinations and has been being treated as an outpatient for urinary tract and symptoms with worsening symptoms. Because of this we will admit the patient to the hospital, IV antibiotics, urine culture. Patient stable      IMPRESSION:  1. Altered mental status, unspecified altered mental status type    2. Hallucinations    3.  Urinary tract infection without hematuria, site unspecified        DISPOSITION:  Admitted      Procedures    Perfect Serve Consult for Admission  2:03 PM    ED Room Number: room air ER30/30  Patient Name and age: Isela Garcia 80 y.o.  male  Working Diagnosis:   1. Altered mental status, unspecified altered mental status type    2. Hallucinations    3. Urinary tract infection without hematuria, site unspecified        COVID-19 Suspicion:  no  Sepsis present:  no  Reassessment needed: no  Code Status:  Full Code  Readmission: no  Isolation Requirements:  no  Recommended Level of Care:  telemetry  Department:[facility] Adult ED - 21   Other:  Came in for mental status change. Treating for UTI, getting worse, hallucinating per daughter. Paraplegic patient.